# Patient Record
Sex: FEMALE | Race: WHITE | NOT HISPANIC OR LATINO | Employment: FULL TIME | URBAN - METROPOLITAN AREA
[De-identification: names, ages, dates, MRNs, and addresses within clinical notes are randomized per-mention and may not be internally consistent; named-entity substitution may affect disease eponyms.]

---

## 2023-03-09 DIAGNOSIS — F41.9 ANXIETY: Primary | ICD-10-CM

## 2023-03-09 RX ORDER — ALPRAZOLAM 0.25 MG/1
0.25 TABLET ORAL DAILY
COMMUNITY
End: 2023-03-09 | Stop reason: SDUPTHER

## 2023-03-09 RX ORDER — ALPRAZOLAM 0.25 MG/1
0.25 TABLET ORAL 3 TIMES DAILY PRN
Qty: 21 TABLET | Refills: 2 | Status: SHIPPED | OUTPATIENT
Start: 2023-03-09 | End: 2023-06-21 | Stop reason: SDUPTHER

## 2023-05-08 PROBLEM — E78.5 HYPERLIPIDEMIA: Status: ACTIVE | Noted: 2023-05-08

## 2023-05-08 PROBLEM — F41.9 ANXIETY: Status: ACTIVE | Noted: 2023-05-08

## 2023-05-08 PROBLEM — G47.00 INSOMNIA: Status: ACTIVE | Noted: 2023-05-08

## 2023-05-08 PROBLEM — G43.909 MIGRAINE HEADACHE: Status: ACTIVE | Noted: 2023-05-08

## 2023-05-08 PROBLEM — D50.9 IRON DEFICIENCY ANEMIA: Status: ACTIVE | Noted: 2023-05-08

## 2023-05-08 PROBLEM — F43.21 GRIEVING: Status: ACTIVE | Noted: 2023-05-08

## 2023-05-10 ENCOUNTER — OFFICE VISIT (OUTPATIENT)
Dept: PRIMARY CARE | Facility: CLINIC | Age: 58
End: 2023-05-10
Payer: COMMERCIAL

## 2023-05-10 VITALS
BODY MASS INDEX: 37.42 KG/M2 | WEIGHT: 218 LBS | HEART RATE: 120 BPM | TEMPERATURE: 97.8 F | SYSTOLIC BLOOD PRESSURE: 128 MMHG | OXYGEN SATURATION: 94 % | DIASTOLIC BLOOD PRESSURE: 70 MMHG | RESPIRATION RATE: 16 BRPM

## 2023-05-10 DIAGNOSIS — F41.9 ANXIETY: Primary | ICD-10-CM

## 2023-05-10 DIAGNOSIS — G43.809 OTHER MIGRAINE WITHOUT STATUS MIGRAINOSUS, NOT INTRACTABLE: ICD-10-CM

## 2023-05-10 DIAGNOSIS — F43.21 GRIEVING: ICD-10-CM

## 2023-05-10 PROCEDURE — 99214 OFFICE O/P EST MOD 30 MIN: CPT | Performed by: INTERNAL MEDICINE

## 2023-05-10 PROCEDURE — 1036F TOBACCO NON-USER: CPT | Performed by: INTERNAL MEDICINE

## 2023-05-10 RX ORDER — LYSINE HCL 500 MG
2 TABLET ORAL DAILY
COMMUNITY
Start: 2013-04-18

## 2023-05-10 RX ORDER — LOPERAMIDE HYDROCHLORIDE 2 MG/1
2 CAPSULE ORAL
COMMUNITY
Start: 2009-07-02

## 2023-05-10 RX ORDER — ACETAMINOPHEN, DIPHENHYDRAMINE HCL, PHENYLEPHRINE HCL 325; 25; 5 MG/1; MG/1; MG/1
TABLET ORAL
COMMUNITY

## 2023-05-10 RX ORDER — CHOLECALCIFEROL (VITAMIN D3) 50 MCG
1 TABLET ORAL
COMMUNITY
Start: 2013-04-18

## 2023-05-10 RX ORDER — BIOTIN 1 MG
1000 TABLET ORAL DAILY
COMMUNITY

## 2023-05-10 RX ORDER — IBUPROFEN 600 MG/1
TABLET ORAL
COMMUNITY
Start: 2014-08-08 | End: 2024-02-14 | Stop reason: SDUPTHER

## 2023-05-10 RX ORDER — BUSPIRONE HYDROCHLORIDE 7.5 MG/1
7.5 TABLET ORAL 2 TIMES DAILY
COMMUNITY
End: 2023-08-16 | Stop reason: SDUPTHER

## 2023-05-10 RX ORDER — CEFUROXIME AXETIL 250 MG/1
6 TABLET ORAL AS NEEDED
COMMUNITY
Start: 2019-01-10 | End: 2024-02-13 | Stop reason: SDUPTHER

## 2023-05-10 ASSESSMENT — ENCOUNTER SYMPTOMS
SHORTNESS OF BREATH: 0
CONSTIPATION: 0
DIARRHEA: 0

## 2023-05-10 NOTE — PROGRESS NOTES
Patient here for a follow up    Subjective   Patient ID: Julia Gandhi is a 58 y.o. female who presents for Follow-up.    The patient has been taking buspirone 7.5mg once daily in the evening and adding alprazolam 0.25mg as needed in the evenings.  She finds that this regimen is helping significantly and her sleep has improved.  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    The patient completed right-sided cataract surgery in 3/2023 with  and is recovering well.  She is following with Ophthalmology and reports that the left eye likely does not need surgery.     The patient is pleased to report a weight loss of 75 lbs since 4/2023 with dietary changes and regular exercise on her treadmill at home.  She is no longer purging after eating heavy meals.  She denies any dyspnea, chest pain, or chest pressure.    The patient denies any bowel problems.      Review of Systems   Respiratory:  Negative for shortness of breath.    Cardiovascular:  Negative for chest pain.   Gastrointestinal:  Negative for constipation and diarrhea.     Objective   Physical Exam  Constitutional:       Appearance: Normal appearance.   Cardiovascular:      Rate and Rhythm: Normal rate and regular rhythm.      Heart sounds: Normal heart sounds.   Pulmonary:      Effort: Pulmonary effort is normal.      Breath sounds: Normal breath sounds.   Abdominal:      General: Bowel sounds are normal.      Palpations: Abdomen is soft.      Tenderness: There is no abdominal tenderness.   Skin:     General: Skin is warm and dry.   Neurological:      General: No focal deficit present.      Mental Status: She is alert and oriented to person, place, and time. Mental status is at baseline.   Psychiatric:         Mood and Affect: Mood normal.         Behavior: Behavior normal.       Assessment/Plan   Problem List Items Addressed This Visit        Anxiety - Primary    Grieving    Migraine headache       IMPRESSIONS/PLAN:     /70 in the office today.     HLD   - Cholesterol, LDL borderline high per 10/2022 lipid panel, patient advised to continue to lower levels through healthy diet and regular physical activity.  She has been doing excellent with exercise.    Grieving   - Significantly improved.  Continue with buspirone 7.5mg every day (she is only taking once daily and this is working well). Discussed side effect profile and therapeutic expectations in detail. Patient verbalized understanding of tapering off rather than stopping abruptly if she decides to discontinue.  Previously on escitalopram 5 mg at bedtime.  Patient has referral to Adult Psychology with Jacque DORSEY. We discussed there is no reason to feel guilty with what happened- she has taken such great care of her mom over the years. She has a low dose of Alprazolam on hand if needed. She has good support of her close friend and her family. I told her she can reach out to me anytime as well if needed.      Anxiety   - Continue with alprazolam 0.25mg as needed for worsening anxiety. Advised not to drive while on this medication or mix with alcohol. OARRS UTD. Substance controlled contract UTD. Drug screen UTD.      Insomnia   - Continue Melatonin Nature's Bounty to help with both falling asleep and staying asleep.     Migraine   - Takes sumatriptan 6mg/0.5mL auto-injector, 0.5mL as needed at onset of migraine.     Vitamin D Deficiency   - Takes Vitamin D 50mcg QD.     Health Maintenance   - Routine labs up to date 10/2022, will repeat prior to next visit. Last Mammogram 1/2023. Last colonoscopy 10/2022, repeat due 10/2027.      Follow up in 3 months, call sooner if needed.        Scribe Attestation  By signing my name below, I, Neisha Eugene   attest that this documentation has been prepared under the direction and in the presence of Emile Israel DO.

## 2023-06-21 DIAGNOSIS — F41.9 ANXIETY: ICD-10-CM

## 2023-06-21 RX ORDER — ALPRAZOLAM 0.25 MG/1
0.25 TABLET ORAL NIGHTLY PRN
Qty: 30 TABLET | Refills: 0 | Status: SHIPPED | OUTPATIENT
Start: 2023-06-21 | End: 2023-08-16 | Stop reason: SDUPTHER

## 2023-07-19 DIAGNOSIS — Z00.00 HEALTHCARE MAINTENANCE: Primary | ICD-10-CM

## 2023-08-07 ENCOUNTER — LAB (OUTPATIENT)
Dept: LAB | Facility: LAB | Age: 58
End: 2023-08-07
Payer: COMMERCIAL

## 2023-08-07 DIAGNOSIS — Z00.00 HEALTHCARE MAINTENANCE: ICD-10-CM

## 2023-08-07 LAB
ALANINE AMINOTRANSFERASE (SGPT) (U/L) IN SER/PLAS: 13 U/L (ref 7–45)
ALBUMIN (G/DL) IN SER/PLAS: 4.4 G/DL (ref 3.4–5)
ALKALINE PHOSPHATASE (U/L) IN SER/PLAS: 58 U/L (ref 33–110)
ANION GAP IN SER/PLAS: 12 MMOL/L (ref 10–20)
ASPARTATE AMINOTRANSFERASE (SGOT) (U/L) IN SER/PLAS: 17 U/L (ref 9–39)
BASOPHILS (10*3/UL) IN BLOOD BY AUTOMATED COUNT: 0.04 X10E9/L (ref 0–0.1)
BASOPHILS/100 LEUKOCYTES IN BLOOD BY AUTOMATED COUNT: 0.6 % (ref 0–2)
BILIRUBIN TOTAL (MG/DL) IN SER/PLAS: 0.7 MG/DL (ref 0–1.2)
CALCIUM (MG/DL) IN SER/PLAS: 9.7 MG/DL (ref 8.6–10.3)
CARBON DIOXIDE, TOTAL (MMOL/L) IN SER/PLAS: 28 MMOL/L (ref 21–32)
CHLORIDE (MMOL/L) IN SER/PLAS: 106 MMOL/L (ref 98–107)
CHOLESTEROL (MG/DL) IN SER/PLAS: 234 MG/DL (ref 0–199)
CHOLESTEROL IN HDL (MG/DL) IN SER/PLAS: 76.9 MG/DL
CHOLESTEROL/HDL RATIO: 3
CREATININE (MG/DL) IN SER/PLAS: 0.91 MG/DL (ref 0.5–1.05)
EOSINOPHILS (10*3/UL) IN BLOOD BY AUTOMATED COUNT: 0.16 X10E9/L (ref 0–0.7)
EOSINOPHILS/100 LEUKOCYTES IN BLOOD BY AUTOMATED COUNT: 2.2 % (ref 0–6)
ERYTHROCYTE DISTRIBUTION WIDTH (RATIO) BY AUTOMATED COUNT: 13.6 % (ref 11.5–14.5)
ERYTHROCYTE MEAN CORPUSCULAR HEMOGLOBIN CONCENTRATION (G/DL) BY AUTOMATED: 32.5 G/DL (ref 32–36)
ERYTHROCYTE MEAN CORPUSCULAR VOLUME (FL) BY AUTOMATED COUNT: 91 FL (ref 80–100)
ERYTHROCYTES (10*6/UL) IN BLOOD BY AUTOMATED COUNT: 4.59 X10E12/L (ref 4–5.2)
ESTIMATED AVERAGE GLUCOSE FOR HBA1C: 111 MG/DL
GFR FEMALE: 73 ML/MIN/1.73M2
GLUCOSE (MG/DL) IN SER/PLAS: 81 MG/DL (ref 74–99)
HEMATOCRIT (%) IN BLOOD BY AUTOMATED COUNT: 41.9 % (ref 36–46)
HEMOGLOBIN (G/DL) IN BLOOD: 13.6 G/DL (ref 12–16)
HEMOGLOBIN A1C/HEMOGLOBIN TOTAL IN BLOOD: 5.5 %
IMMATURE GRANULOCYTES/100 LEUKOCYTES IN BLOOD BY AUTOMATED COUNT: 0.4 % (ref 0–0.9)
LDL: 135 MG/DL (ref 0–99)
LEUKOCYTES (10*3/UL) IN BLOOD BY AUTOMATED COUNT: 7.2 X10E9/L (ref 4.4–11.3)
LYMPHOCYTES (10*3/UL) IN BLOOD BY AUTOMATED COUNT: 2.41 X10E9/L (ref 1.2–4.8)
LYMPHOCYTES/100 LEUKOCYTES IN BLOOD BY AUTOMATED COUNT: 33.4 % (ref 13–44)
MONOCYTES (10*3/UL) IN BLOOD BY AUTOMATED COUNT: 0.4 X10E9/L (ref 0.1–1)
MONOCYTES/100 LEUKOCYTES IN BLOOD BY AUTOMATED COUNT: 5.5 % (ref 2–10)
NEUTROPHILS (10*3/UL) IN BLOOD BY AUTOMATED COUNT: 4.18 X10E9/L (ref 1.2–7.7)
NEUTROPHILS/100 LEUKOCYTES IN BLOOD BY AUTOMATED COUNT: 57.9 % (ref 40–80)
PLATELETS (10*3/UL) IN BLOOD AUTOMATED COUNT: 326 X10E9/L (ref 150–450)
POTASSIUM (MMOL/L) IN SER/PLAS: 4 MMOL/L (ref 3.5–5.3)
PROTEIN TOTAL: 7.1 G/DL (ref 6.4–8.2)
SODIUM (MMOL/L) IN SER/PLAS: 142 MMOL/L (ref 136–145)
THYROTROPIN (MIU/L) IN SER/PLAS BY DETECTION LIMIT <= 0.05 MIU/L: 2.37 MIU/L (ref 0.44–3.98)
TRIGLYCERIDE (MG/DL) IN SER/PLAS: 112 MG/DL (ref 0–149)
UREA NITROGEN (MG/DL) IN SER/PLAS: 13 MG/DL (ref 6–23)
VLDL: 22 MG/DL (ref 0–40)

## 2023-08-07 PROCEDURE — 80053 COMPREHEN METABOLIC PANEL: CPT

## 2023-08-07 PROCEDURE — 36415 COLL VENOUS BLD VENIPUNCTURE: CPT

## 2023-08-07 PROCEDURE — 85025 COMPLETE CBC W/AUTO DIFF WBC: CPT

## 2023-08-07 PROCEDURE — 83036 HEMOGLOBIN GLYCOSYLATED A1C: CPT

## 2023-08-07 PROCEDURE — 80061 LIPID PANEL: CPT

## 2023-08-07 PROCEDURE — 84443 ASSAY THYROID STIM HORMONE: CPT

## 2023-08-16 ENCOUNTER — OFFICE VISIT (OUTPATIENT)
Dept: PRIMARY CARE | Facility: CLINIC | Age: 58
End: 2023-08-16
Payer: COMMERCIAL

## 2023-08-16 VITALS
TEMPERATURE: 97.7 F | OXYGEN SATURATION: 92 % | BODY MASS INDEX: 36.9 KG/M2 | DIASTOLIC BLOOD PRESSURE: 80 MMHG | WEIGHT: 215 LBS | RESPIRATION RATE: 16 BRPM | SYSTOLIC BLOOD PRESSURE: 128 MMHG | HEART RATE: 89 BPM

## 2023-08-16 DIAGNOSIS — F41.9 ANXIETY: ICD-10-CM

## 2023-08-16 PROBLEM — H52.4 PRESBYOPIA: Status: ACTIVE | Noted: 2023-07-27

## 2023-08-16 PROBLEM — H25.012 CORTICAL AGE-RELATED CATARACT OF LEFT EYE: Status: ACTIVE | Noted: 2023-02-18

## 2023-08-16 PROBLEM — H25.13 AGE-RELATED NUCLEAR CATARACT OF BOTH EYES: Status: ACTIVE | Noted: 2023-02-18

## 2023-08-16 PROBLEM — H33.192: Status: ACTIVE | Noted: 2023-02-18

## 2023-08-16 PROBLEM — H52.13 MYOPIA, BILATERAL: Status: ACTIVE | Noted: 2023-07-27

## 2023-08-16 PROBLEM — H43.822 VITREOMACULAR ADHESION OF LEFT EYE: Status: ACTIVE | Noted: 2023-02-18

## 2023-08-16 PROBLEM — H43.813 VITREOUS DEGENERATION OF BOTH EYES: Status: ACTIVE | Noted: 2023-02-18

## 2023-08-16 PROBLEM — H35.372 EPIRETINAL MEMBRANE, LEFT EYE: Status: ACTIVE | Noted: 2023-02-18

## 2023-08-16 PROBLEM — H43.812 POSTERIOR VITREOUS DETACHMENT OF LEFT EYE: Status: ACTIVE | Noted: 2023-07-27

## 2023-08-16 PROBLEM — Z96.1 PSEUDOPHAKIA OF RIGHT EYE: Status: ACTIVE | Noted: 2023-04-25

## 2023-08-16 PROBLEM — H04.123 DRY EYE SYNDROME OF BOTH EYES: Status: ACTIVE | Noted: 2023-02-18

## 2023-08-16 PROBLEM — Z98.890 HX OF VITRECTOMY: Status: ACTIVE | Noted: 2023-07-27

## 2023-08-16 PROBLEM — H25.12 NUCLEAR SCLEROTIC CATARACT OF LEFT EYE: Status: ACTIVE | Noted: 2023-04-25

## 2023-08-16 PROBLEM — M25.9 DISORDER OF SHOULDER: Status: ACTIVE | Noted: 2018-01-18

## 2023-08-16 PROCEDURE — 99214 OFFICE O/P EST MOD 30 MIN: CPT | Performed by: INTERNAL MEDICINE

## 2023-08-16 PROCEDURE — 1036F TOBACCO NON-USER: CPT | Performed by: INTERNAL MEDICINE

## 2023-08-16 RX ORDER — BUSPIRONE HYDROCHLORIDE 7.5 MG/1
7.5 TABLET ORAL 2 TIMES DAILY
Qty: 90 TABLET | Refills: 3 | Status: SHIPPED | OUTPATIENT
Start: 2023-08-16 | End: 2024-02-19 | Stop reason: SDUPTHER

## 2023-08-16 RX ORDER — ALPRAZOLAM 0.25 MG/1
0.25 TABLET ORAL NIGHTLY PRN
Qty: 30 TABLET | Refills: 0 | Status: SHIPPED | OUTPATIENT
Start: 2023-08-16 | End: 2023-10-10 | Stop reason: SDUPTHER

## 2023-08-16 NOTE — PROGRESS NOTES
Patient here for a 3 month follow up    Subjective   Patient ID: Julia Gandhi is a 58 y.o. female who presents for Follow-up.  She is generally doing well today.    The patient has been taking buspirone 7.5 mg once nightly, and finds this is helping to control symptoms well.  She has noticed mild lightheadedness after she takes the medication, and has been avoiding taking it during the day time.  She states that she has not taken escitalopram in the past due to concerns about adverse effects.     The patient mentions occasional migraines which she notes seem to be worse with changes in humidity.    The patient continues to follow with  from Ophthalmology for pseudophakia of the right eye, and bilateral age-related nuclear cataracts.    The patient denies any hearing impairment.    The patient is pleased to report a weight loss of 71 lbs since 8/2022 which she attributes to regular exercise on her treadmill, and a healthy diet.        Review of Systems   HENT:  Negative for hearing loss.    All other systems reviewed and are negative.    Objective   Physical Exam  Constitutional:       Appearance: Normal appearance.   Neck:      Vascular: No carotid bruit.   Cardiovascular:      Rate and Rhythm: Normal rate and regular rhythm.      Heart sounds: Normal heart sounds.   Pulmonary:      Effort: Pulmonary effort is normal.      Breath sounds: Normal breath sounds.   Abdominal:      General: Bowel sounds are normal.      Palpations: Abdomen is soft.      Tenderness: There is no abdominal tenderness.   Skin:     General: Skin is warm and dry.   Neurological:      General: No focal deficit present.      Mental Status: She is alert and oriented to person, place, and time. Mental status is at baseline.   Psychiatric:         Mood and Affect: Mood normal.         Behavior: Behavior normal.       Assessment/Plan       IMPRESSIONS/PLAN:     /80 in the office today.     HLD   - Cholesterol, LDL borderline high  per 8/2023 lipid panel, patient advised to continue to lower levels through healthy diet and regular physical activity.  She has been doing excellent with exercise.  ASCVD 2.1% per 8/2023.       Grieving   - Significantly improved.  Refilled buspirone 7.5mg every day (she is only taking once nightly and this is working well).  She has a low dose of Alprazolam on hand if needed.     Anxiety   - Stable.  Refilled alprazolam 0.25mg as needed for worsening anxiety. Advised not to drive while on this medication or mix with alcohol. OARRS UTD. Substance controlled contract UTD. Drug screen UTD.      Insomnia   - Continue Melatonin Nature's Bounty to help with both falling asleep and staying asleep.     Migraine   - Takes sumatriptan 6mg/0.5mL auto-injector, 0.5mL as needed at onset of migraine.     Vitamin D Deficiency   - Takes Vitamin D 50mcg QD.     Health Maintenance   - Routine labs 8/2023. Last Mammogram  1/2023. Last colonoscopy 10/2022, repeat due 10/2027.      Follow up in 3 months, call sooner if needed.        Scribe Attestation  By signing my name below, I, Neisha Eugene   attest that this documentation has been prepared under the direction and in the presence of Emile Israel DO.

## 2023-10-10 DIAGNOSIS — F41.9 ANXIETY: ICD-10-CM

## 2023-10-10 RX ORDER — ALPRAZOLAM 0.25 MG/1
0.25 TABLET ORAL NIGHTLY PRN
Qty: 30 TABLET | Refills: 0 | Status: SHIPPED | OUTPATIENT
Start: 2023-10-10 | End: 2023-10-20 | Stop reason: SDUPTHER

## 2023-10-20 DIAGNOSIS — F41.9 ANXIETY: ICD-10-CM

## 2023-10-20 RX ORDER — ALPRAZOLAM 0.25 MG/1
0.25 TABLET ORAL NIGHTLY PRN
Qty: 30 TABLET | Refills: 0 | Status: SHIPPED | OUTPATIENT
Start: 2023-10-20 | End: 2023-11-13 | Stop reason: SDUPTHER

## 2023-11-13 ENCOUNTER — OFFICE VISIT (OUTPATIENT)
Dept: PRIMARY CARE | Facility: CLINIC | Age: 58
End: 2023-11-13
Payer: COMMERCIAL

## 2023-11-13 VITALS
HEIGHT: 64 IN | OXYGEN SATURATION: 96 % | TEMPERATURE: 98 F | HEART RATE: 54 BPM | BODY MASS INDEX: 36.37 KG/M2 | WEIGHT: 213 LBS | DIASTOLIC BLOOD PRESSURE: 80 MMHG | RESPIRATION RATE: 16 BRPM | SYSTOLIC BLOOD PRESSURE: 128 MMHG

## 2023-11-13 DIAGNOSIS — F41.9 ANXIETY: ICD-10-CM

## 2023-11-13 DIAGNOSIS — Z23 ENCOUNTER FOR IMMUNIZATION: ICD-10-CM

## 2023-11-13 DIAGNOSIS — E78.5 HYPERLIPIDEMIA, UNSPECIFIED HYPERLIPIDEMIA TYPE: Primary | ICD-10-CM

## 2023-11-13 PROCEDURE — 90471 IMMUNIZATION ADMIN: CPT | Performed by: INTERNAL MEDICINE

## 2023-11-13 PROCEDURE — 99396 PREV VISIT EST AGE 40-64: CPT | Performed by: INTERNAL MEDICINE

## 2023-11-13 PROCEDURE — 1036F TOBACCO NON-USER: CPT | Performed by: INTERNAL MEDICINE

## 2023-11-13 PROCEDURE — 93000 ELECTROCARDIOGRAM COMPLETE: CPT | Performed by: INTERNAL MEDICINE

## 2023-11-13 PROCEDURE — 90686 IIV4 VACC NO PRSV 0.5 ML IM: CPT | Performed by: INTERNAL MEDICINE

## 2023-11-13 RX ORDER — ALPRAZOLAM 0.25 MG/1
0.25 TABLET ORAL NIGHTLY PRN
Qty: 30 TABLET | Refills: 0 | Status: SHIPPED | OUTPATIENT
Start: 2023-11-13 | End: 2023-12-12 | Stop reason: SDUPTHER

## 2023-11-13 ASSESSMENT — ENCOUNTER SYMPTOMS
DIARRHEA: 0
CONSTIPATION: 0
FREQUENCY: 0

## 2023-11-13 NOTE — PROGRESS NOTES
Patient here for a physical     Subjective   Patient ID: Julia Gandhi is a 58 y.o. female who presents for Annual Exam.    The patient recently underwent right-sided YAG laser capsulotomy after cataract surgery several months prior in the same eye.  She continues to follow with  from Ophthalmology.    The patient continues to take buspirone 7.5 mg once nightly as well as alprazolam 0.25mg rarely as needed, and finds this regimen is working well.  She is also taking Melatonin in the evening, and finds this is helping with sleep.    The patient is following with  from Obstetrics[facility]ne[facility].     The patient is careful to maintain a healthy diet via intermittent fasting and regular physical activity.  She is taking cranberry supplements and probiotics.  As a result, she denies any urinary symptoms or bowel problems.      Review of Systems   Gastrointestinal:  Negative for constipation and diarrhea.   Genitourinary:  Negative for frequency.     Objective   Physical Exam  Constitutional:       Appearance: Normal appearance.   Neck:      Vascular: No carotid bruit.   Cardiovascular:      Rate and Rhythm: Normal rate and regular rhythm.      Heart sounds: Normal heart sounds.   Pulmonary:      Effort: Pulmonary effort is normal.      Breath sounds: Normal breath sounds.   Abdominal:      General: Bowel sounds are normal.      Palpations: Abdomen is soft.      Tenderness: There is no abdominal tenderness.   Skin:     General: Skin is warm and dry.   Neurological:      General: No focal deficit present.      Mental Status: She is alert and oriented to person, place, and time. Mental status is at baseline.   Psychiatric:         Mood and Affect: Mood normal.         Behavior: Behavior normal.       Assessment/Plan   Problem List Items Addressed This Visit             ICD-10-CM    Anxiety F41.9    Relevant Medications    ALPRAZolam (Xanax) 0.25 mg tablet    Hyperlipidemia - Primary  E78.5    Relevant Orders    ECG 12 lead (Clinic Performed) (Completed)     Other Visit Diagnoses         Codes    Encounter for immunization     Z23    Relevant Orders    Flu vaccine (IIV4) age 6 months and greater, preservative free (Completed)            CPE Performed  -  Discussed healthy diet and regular exercise.    -  Physical exam overall unremarkable. Immunizations reviewed and updated accordingly. Healthy lifestyle choices discussed (tobacco avoidance, appropriate alcohol use, avoidance of illicit substances).   -  Patient is wearing seatbelt.   -  Screening lab work ordered as indicated.    -  Age appropriate screening tests reviewed with patient.        EKG unremarkable compared to previous.    IMPRESSIONS/PLAN:     /80 in the office today.     HLD   - Cholesterol, LDL borderline high per 8/2023 lipid panel, patient advised to continue to lower levels through healthy diet and regular physical activity.  She has been doing excellent with exercise.  ASCVD 2.1% per 8/2023.       Grieving   - Significantly improved.  Refilled buspirone 7.5mg every day (she is only taking once nightly and this is working well).  She has a low dose of Alprazolam on hand if needed.     Anxiety   - Stable.  Refilled alprazolam 0.25mg as needed for worsening anxiety. Advised not to drive while on this medication or mix with alcohol. OARRS UTD. Substance controlled contract UTD. Drug screen UTD.      Insomnia   - Continue Melatonin Nature's Bounty to help with both falling asleep and staying asleep.     Migraine   - Takes sumatriptan 6mg/0.5mL auto-injector, 0.5mL as needed at onset of migraine.     Vitamin D Deficiency   - Takes Vitamin D 50mcg QD.     Women's Wellness  - Following with  from Obstetrics/Gynecology.    Health Maintenance   - Routine labs 8/2023. Last Mammogram  1/2023. Last colonoscopy 10/2022, repeat due 10/2027. Patient received Influenza vaccine in the clinic today, tolerated well.      Follow  up in 3 months, call sooner if needed.        Scribe Attestation  By signing my name below, I, Heidi Guy, Neisha   attest that this documentation has been prepared under the direction and in the presence of Emile Israel DO.

## 2023-12-12 DIAGNOSIS — F41.9 ANXIETY: ICD-10-CM

## 2023-12-12 RX ORDER — ALPRAZOLAM 0.25 MG/1
0.25 TABLET ORAL NIGHTLY PRN
Qty: 30 TABLET | Refills: 0 | Status: SHIPPED | OUTPATIENT
Start: 2023-12-12 | End: 2024-01-18 | Stop reason: SDUPTHER

## 2024-01-18 DIAGNOSIS — F41.9 ANXIETY: ICD-10-CM

## 2024-01-18 RX ORDER — ALPRAZOLAM 0.25 MG/1
0.25 TABLET ORAL NIGHTLY PRN
Qty: 30 TABLET | Refills: 0 | Status: SHIPPED | OUTPATIENT
Start: 2024-01-18 | End: 2024-02-14 | Stop reason: SDUPTHER

## 2024-02-13 ENCOUNTER — APPOINTMENT (OUTPATIENT)
Dept: RADIOLOGY | Facility: HOSPITAL | Age: 59
End: 2024-02-13
Payer: COMMERCIAL

## 2024-02-13 ENCOUNTER — HOSPITAL ENCOUNTER (EMERGENCY)
Facility: HOSPITAL | Age: 59
Discharge: HOME | End: 2024-02-13
Attending: EMERGENCY MEDICINE
Payer: COMMERCIAL

## 2024-02-13 ENCOUNTER — PATIENT MESSAGE (OUTPATIENT)
Dept: PRIMARY CARE | Facility: CLINIC | Age: 59
End: 2024-02-13
Payer: COMMERCIAL

## 2024-02-13 VITALS
HEART RATE: 90 BPM | OXYGEN SATURATION: 93 % | WEIGHT: 160 LBS | SYSTOLIC BLOOD PRESSURE: 131 MMHG | BODY MASS INDEX: 27.31 KG/M2 | DIASTOLIC BLOOD PRESSURE: 71 MMHG | HEIGHT: 64 IN | RESPIRATION RATE: 18 BRPM | TEMPERATURE: 98.1 F

## 2024-02-13 DIAGNOSIS — G43.809 OTHER MIGRAINE WITHOUT STATUS MIGRAINOSUS, NOT INTRACTABLE: ICD-10-CM

## 2024-02-13 DIAGNOSIS — T14.8XXA ABRASION: ICD-10-CM

## 2024-02-13 DIAGNOSIS — G43.809 OTHER MIGRAINE WITHOUT STATUS MIGRAINOSUS, NOT INTRACTABLE: Primary | ICD-10-CM

## 2024-02-13 DIAGNOSIS — S09.90XA CLOSED HEAD INJURY, INITIAL ENCOUNTER: Primary | ICD-10-CM

## 2024-02-13 PROCEDURE — 99285 EMERGENCY DEPT VISIT HI MDM: CPT | Mod: 25 | Performed by: EMERGENCY MEDICINE

## 2024-02-13 PROCEDURE — 70486 CT MAXILLOFACIAL W/O DYE: CPT

## 2024-02-13 PROCEDURE — 70450 CT HEAD/BRAIN W/O DYE: CPT | Performed by: RADIOLOGY

## 2024-02-13 PROCEDURE — 90715 TDAP VACCINE 7 YRS/> IM: CPT | Performed by: STUDENT IN AN ORGANIZED HEALTH CARE EDUCATION/TRAINING PROGRAM

## 2024-02-13 PROCEDURE — 70486 CT MAXILLOFACIAL W/O DYE: CPT | Performed by: RADIOLOGY

## 2024-02-13 PROCEDURE — 70450 CT HEAD/BRAIN W/O DYE: CPT

## 2024-02-13 PROCEDURE — 90471 IMMUNIZATION ADMIN: CPT | Performed by: STUDENT IN AN ORGANIZED HEALTH CARE EDUCATION/TRAINING PROGRAM

## 2024-02-13 PROCEDURE — 99284 EMERGENCY DEPT VISIT MOD MDM: CPT | Performed by: EMERGENCY MEDICINE

## 2024-02-13 PROCEDURE — 76377 3D RENDER W/INTRP POSTPROCES: CPT | Performed by: RADIOLOGY

## 2024-02-13 PROCEDURE — 76377 3D RENDER W/INTRP POSTPROCES: CPT

## 2024-02-13 PROCEDURE — 2500000004 HC RX 250 GENERAL PHARMACY W/ HCPCS (ALT 636 FOR OP/ED): Performed by: STUDENT IN AN ORGANIZED HEALTH CARE EDUCATION/TRAINING PROGRAM

## 2024-02-13 RX ORDER — CEFUROXIME AXETIL 250 MG/1
6 TABLET ORAL AS NEEDED
Qty: 1 ML | Refills: 2 | Status: SHIPPED | OUTPATIENT
Start: 2024-02-13 | End: 2024-05-20 | Stop reason: SDUPTHER

## 2024-02-13 RX ORDER — CEFUROXIME AXETIL 250 MG/1
6 TABLET ORAL AS NEEDED
Qty: 1 ML | Refills: 2 | Status: SHIPPED | OUTPATIENT
Start: 2024-02-13 | End: 2024-02-13

## 2024-02-13 RX ADMIN — TETANUS TOXOID, REDUCED DIPHTHERIA TOXOID AND ACELLULAR PERTUSSIS VACCINE, ADSORBED 0.5 ML: 5; 2.5; 8; 8; 2.5 SUSPENSION INTRAMUSCULAR at 20:02

## 2024-02-13 ASSESSMENT — COLUMBIA-SUICIDE SEVERITY RATING SCALE - C-SSRS
1. IN THE PAST MONTH, HAVE YOU WISHED YOU WERE DEAD OR WISHED YOU COULD GO TO SLEEP AND NOT WAKE UP?: NO
2. HAVE YOU ACTUALLY HAD ANY THOUGHTS OF KILLING YOURSELF?: NO
6. HAVE YOU EVER DONE ANYTHING, STARTED TO DO ANYTHING, OR PREPARED TO DO ANYTHING TO END YOUR LIFE?: NO

## 2024-02-13 ASSESSMENT — PAIN DESCRIPTION - ORIENTATION: ORIENTATION: RIGHT

## 2024-02-13 ASSESSMENT — PAIN DESCRIPTION - LOCATION: LOCATION: HEAD

## 2024-02-13 ASSESSMENT — PAIN DESCRIPTION - DESCRIPTORS: DESCRIPTORS: ACHING

## 2024-02-13 ASSESSMENT — PAIN SCALES - GENERAL: PAINLEVEL_OUTOF10: 5 - MODERATE PAIN

## 2024-02-13 ASSESSMENT — LIFESTYLE VARIABLES
HAVE YOU EVER FELT YOU SHOULD CUT DOWN ON YOUR DRINKING: NO
EVER HAD A DRINK FIRST THING IN THE MORNING TO STEADY YOUR NERVES TO GET RID OF A HANGOVER: NO
HAVE PEOPLE ANNOYED YOU BY CRITICIZING YOUR DRINKING: NO
EVER FELT BAD OR GUILTY ABOUT YOUR DRINKING: NO

## 2024-02-13 ASSESSMENT — PAIN - FUNCTIONAL ASSESSMENT: PAIN_FUNCTIONAL_ASSESSMENT: 0-10

## 2024-02-13 ASSESSMENT — PAIN DESCRIPTION - ONSET: ONSET: ONGOING

## 2024-02-13 ASSESSMENT — PAIN DESCRIPTION - FREQUENCY: FREQUENCY: CONSTANT/CONTINUOUS

## 2024-02-13 ASSESSMENT — PAIN DESCRIPTION - PAIN TYPE: TYPE: ACUTE PAIN

## 2024-02-13 ASSESSMENT — PAIN DESCRIPTION - PROGRESSION: CLINICAL_PROGRESSION: NOT CHANGED

## 2024-02-14 ENCOUNTER — OFFICE VISIT (OUTPATIENT)
Dept: PRIMARY CARE | Facility: CLINIC | Age: 59
End: 2024-02-14
Payer: COMMERCIAL

## 2024-02-14 VITALS
HEART RATE: 86 BPM | OXYGEN SATURATION: 98 % | RESPIRATION RATE: 16 BRPM | SYSTOLIC BLOOD PRESSURE: 130 MMHG | TEMPERATURE: 97.3 F | DIASTOLIC BLOOD PRESSURE: 80 MMHG

## 2024-02-14 DIAGNOSIS — F41.9 ANXIETY: ICD-10-CM
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
## 2024-02-14 PROCEDURE — 99214 OFFICE O/P EST MOD 30 MIN: CPT | Performed by: INTERNAL MEDICINE

## 2024-02-14 PROCEDURE — 1036F TOBACCO NON-USER: CPT | Performed by: INTERNAL MEDICINE

## 2024-02-14 RX ORDER — ALPRAZOLAM 0.25 MG/1
0.25 TABLET ORAL NIGHTLY PRN
Qty: 30 TABLET | Refills: 0 | Status: SHIPPED | OUTPATIENT
Start: 2024-02-14 | End: 2024-03-18 | Stop reason: SDUPTHER

## 2024-02-14 RX ORDER — IBUPROFEN 600 MG/1
TABLET ORAL
Qty: 60 TABLET | Refills: 0 | Status: SHIPPED | OUTPATIENT
Start: 2024-02-14

## 2024-02-14 ASSESSMENT — ENCOUNTER SYMPTOMS
SLEEP DISTURBANCE: 1
DYSPHORIC MOOD: 1

## 2024-02-14 NOTE — ED PROVIDER NOTES
HPI   Chief Complaint   Patient presents with    Fall     Patient states she was bring trash cans back to the house slipped and fell in the mud. Patient states struck head and face on garbage can. No LOC. No blood thinners. Patient states pain head has become worse. Pt denies nausea, vomiting, changes in vison or speech.        Patient is a 59-year-old female presents the ER due to concern for closed head injury.  Patient states that she was pushing garbage can when she actually stepped forward and hit her head.  She not lose consciousness however has a large hematoma on her scalp.  She also did hit her face.  She does have some right-sided maxillary pain.  She denies any dental fractures.  She denies any loss conscious.  She is not on blood thinners.  She denies any neck pain.  She denies any chest pain, shortness of breath, abdominal pain.      History provided by:  Patient   used: No                        McArthur Coma Scale Score: 15                     Patient History   Past Medical History:   Diagnosis Date    Morbid (severe) obesity due to excess calories (CMS/[facility]) 12/27/2022    Morbid obesity with BMI of 40.0-44.9, adult     Past Surgical History:   Procedure Laterality Date    COLONOSCOPY  11/05/2012    Complete Colonoscopy    LAPAROSCOPY DIAGNOSTIC / BIOPSY / ASPIRATION / LYSIS  06/17/2013    Laparoscopy (Diagnostic)    SHOULDER SURGERY  12/03/2012    Shoulder Surgery     Family History   Problem Relation Name Age of Onset    Coronary artery disease Mother      Colon cancer Father      Coronary artery disease Father       Social History     Tobacco Use    Smoking status: Never    Smokeless tobacco: Never   Vaping Use    Vaping Use: Never used   Substance Use Topics    Alcohol use: Never    Drug use: Never       Physical Exam   ED Triage Vitals [02/13/24 1811]   Temperature Heart Rate Respirations BP   36.7 °C (98.1 °F) 86 18 (!) 148/92      Pulse Ox Temp Source Heart Rate Source Patient  Position   95 % Temporal Monitor Sitting      BP Location FiO2 (%)     Right arm --       Physical Exam  Vitals and nursing note reviewed.   Constitutional:       General: She is not in acute distress.     Appearance: She is well-developed.   HENT:      Head: Normocephalic.      Comments: Large posterior scalp hematoma.     Ears:      Comments: No hemotympanum b/l     Nose:      Comments: No nasal septal hematoma  Eyes:      Conjunctiva/sclera: Conjunctivae normal.   Neck:      Comments: No midline tenderness.  Cardiovascular:      Rate and Rhythm: Normal rate and regular rhythm.      Heart sounds: No murmur heard.  Pulmonary:      Effort: Pulmonary effort is normal. No respiratory distress.      Breath sounds: Normal breath sounds.   Abdominal:      Palpations: Abdomen is soft.      Tenderness: There is no abdominal tenderness.   Musculoskeletal:         General: No swelling.      Cervical back: Neck supple.   Skin:     General: Skin is warm and dry.      Capillary Refill: Capillary refill takes less than 2 seconds.   Neurological:      Mental Status: She is alert.   Psychiatric:         Mood and Affect: Mood normal.         ED Course & MDM   ED Course as of 02/13/24 2041   Tue Feb 13, 2024 2040 Patient is a 59-year-old female presents to the ER due to concern for closed head injury.  On arrival she was in no acute distress.  Vitals are stable.  She we did order CT imaging of head and face for patient.  Based on [place] C-spine imaging and [facility], do not feel patient requires C-spine imaging at this time. [MJ]   2041 CT maxillofacial bones wo IV contrast [MJ]   2041 CT head wo IV contrast  IMPRESSION:  No acute intracranial abnormality.      Moderate soft tissue swelling and hematoma overlying the cranial  vertex.      No acute facial bone fracture.   [MJ]   2041 CT imaging of patient was unremarkable.  Difficulty will discharge patient home.  She was instructed follow-up with her primary care provider.  She did  receive [employer] updated for Tdap update today.  She was given strict return precautions.  Patient was understanding and agreeable with plan for discharge. [MJ]      ED Course User Index  [MJ] Juan RuthchagoDO marcia         Diagnoses as of 02/13/24 2041   Closed head injury, initial encounter   Abrasion       Medical Decision Making  =================Attending note===============    The patient was seen by the resident/fellow.  I have personally performed a substantive portion of the encounter.  I have seen and examined the patient; agree with the workup, evaluation, MDM,   management and diagnosis.  The care plan has been discussed with the resident; I have reviewed the resident's note and agree with the documented findings.      This is a 59 y.o. female who presents to ER with chief complaint of closed head injury after mechanical fall.  Patient was taking out the garbage to the curb and there was some mud and she slipped and she hit the right side of her face on the garbage can and then she hit the top of her head.  She is unsure if she hit her head on the ground or some in the fell out of the garbage can.  There is no loss of conscious.  No blood thinners.  No nausea or vomiting.  No midline neck pain.  There is a minor abrasion to the right side of her face.  She is really chronic medical problems migraines.  PERRLA.  EOMI.  Minor abrasion to the right cheek just lateral to the nose.  No CSF rhinorrhea.  No midline cervical spine tenderness.  There is some paraspinal neck tenderness.  No numbness or tingling or increasing pain with range of motion of the neck in all directions.  Heart regular.  Lungs clear.  Abdomen soft and nontender.  Moving extremities x 4 without difficulty.  No pain in the arms or the legs or the hips.  No back pain.    CT imaging had no acute findings.  We CT scan her face and head.  Cervical spine was cleared by [place] [facility] cervical spine rules.    Tetanus is updated since it was greater  than 5 years from her last tetanus vaccination and she had a scrape to the face from a dirty garbage can.    She is given closed head injury discharge instructions.  She is to follow-up with her doctor.  She is to return to the nearest ER for any new or worsening symptoms.  She happy with this plan.            ==========================================          Procedure  Procedures     Juan Mills,   Resident  02/13/24 2041       Errol Pryor, DO  02/15/24 0027

## 2024-02-14 NOTE — DISCHARGE INSTRUCTIONS
Seek immediate medical attention if you develop: new or worsening headache, nausea, vomiting, confusion, weakness, loss of motion in your arms or legs, loss of control of your urine or stool, difficulty waking from sleep, or any new or worsening symptoms.    Have someone check on you and wake you from sleep every 2 hours for the next 24 hours to make sure that you are doing well.    Seek immediate medical attention if your wound develops: redness, swelling, warmth to touch, discharge or drainage, increasing pain, or any new or worsening symptoms.

## 2024-02-16 ENCOUNTER — APPOINTMENT (OUTPATIENT)
Dept: PRIMARY CARE | Facility: CLINIC | Age: 59
End: 2024-02-16
Payer: COMMERCIAL

## 2024-02-19 DIAGNOSIS — F41.9 ANXIETY: ICD-10-CM

## 2024-02-19 RX ORDER — BUSPIRONE HYDROCHLORIDE 7.5 MG/1
7.5 TABLET ORAL 2 TIMES DAILY
Qty: 90 TABLET | Refills: 3 | Status: SHIPPED | OUTPATIENT
Start: 2024-02-19 | End: 2024-03-06 | Stop reason: SDUPTHER

## 2024-02-29 ENCOUNTER — PATIENT MESSAGE (OUTPATIENT)
Dept: PRIMARY CARE | Facility: CLINIC | Age: 59
End: 2024-02-29
Payer: COMMERCIAL

## 2024-02-29 ENCOUNTER — LAB (OUTPATIENT)
Dept: LAB | Facility: LAB | Age: 59
End: 2024-02-29
Payer: COMMERCIAL

## 2024-02-29 DIAGNOSIS — R53.83 OTHER FATIGUE: Primary | ICD-10-CM

## 2024-02-29 DIAGNOSIS — R53.83 OTHER FATIGUE: ICD-10-CM

## 2024-02-29 LAB
25(OH)D3 SERPL-MCNC: 22 NG/ML (ref 30–100)
BASOPHILS # BLD AUTO: 0.08 X10*3/UL (ref 0–0.1)
BASOPHILS NFR BLD AUTO: 0.9 %
EOSINOPHIL # BLD AUTO: 0.13 X10*3/UL (ref 0–0.7)
EOSINOPHIL NFR BLD AUTO: 1.5 %
ERYTHROCYTE [DISTWIDTH] IN BLOOD BY AUTOMATED COUNT: 13.3 % (ref 11.5–14.5)
HCT VFR BLD AUTO: 41.3 % (ref 36–46)
HGB BLD-MCNC: 13.2 G/DL (ref 12–16)
IMM GRANULOCYTES # BLD AUTO: 0.02 X10*3/UL (ref 0–0.7)
IMM GRANULOCYTES NFR BLD AUTO: 0.2 % (ref 0–0.9)
IRON SATN MFR SERPL: 13 % (ref 25–45)
IRON SERPL-MCNC: 47 UG/DL (ref 35–150)
LYMPHOCYTES # BLD AUTO: 3.28 X10*3/UL (ref 1.2–4.8)
LYMPHOCYTES NFR BLD AUTO: 37.8 %
MCH RBC QN AUTO: 29.2 PG (ref 26–34)
MCHC RBC AUTO-ENTMCNC: 32 G/DL (ref 32–36)
MCV RBC AUTO: 91 FL (ref 80–100)
MONOCYTES # BLD AUTO: 0.56 X10*3/UL (ref 0.1–1)
MONOCYTES NFR BLD AUTO: 6.5 %
NEUTROPHILS # BLD AUTO: 4.61 X10*3/UL (ref 1.2–7.7)
NEUTROPHILS NFR BLD AUTO: 53.1 %
NRBC BLD-RTO: 0 /100 WBCS (ref 0–0)
PLATELET # BLD AUTO: 348 X10*3/UL (ref 150–450)
RBC # BLD AUTO: 4.52 X10*6/UL (ref 4–5.2)
T3 SERPL-MCNC: 97 NG/DL (ref 60–200)
T4 FREE SERPL-MCNC: 0.7 NG/DL (ref 0.61–1.12)
TIBC SERPL-MCNC: 369 UG/DL (ref 240–445)
TSH SERPL-ACNC: 2.8 MIU/L (ref 0.44–3.98)
UIBC SERPL-MCNC: 322 UG/DL (ref 110–370)
VIT B12 SERPL-MCNC: 404 PG/ML (ref 211–911)
WBC # BLD AUTO: 8.7 X10*3/UL (ref 4.4–11.3)

## 2024-02-29 PROCEDURE — 83540 ASSAY OF IRON: CPT

## 2024-02-29 PROCEDURE — 82306 VITAMIN D 25 HYDROXY: CPT

## 2024-02-29 PROCEDURE — 82607 VITAMIN B-12: CPT

## 2024-02-29 PROCEDURE — 85025 COMPLETE CBC W/AUTO DIFF WBC: CPT

## 2024-02-29 PROCEDURE — 83550 IRON BINDING TEST: CPT

## 2024-02-29 PROCEDURE — 84439 ASSAY OF FREE THYROXINE: CPT

## 2024-02-29 PROCEDURE — 84443 ASSAY THYROID STIM HORMONE: CPT

## 2024-02-29 PROCEDURE — 84480 ASSAY TRIIODOTHYRONINE (T3): CPT

## 2024-02-29 PROCEDURE — 36415 COLL VENOUS BLD VENIPUNCTURE: CPT

## 2024-03-02 DIAGNOSIS — E55.9 VITAMIN D DEFICIENCY: Primary | ICD-10-CM

## 2024-03-02 RX ORDER — ERGOCALCIFEROL 1.25 MG/1
50000 CAPSULE ORAL
Qty: 12 CAPSULE | Refills: 0 | Status: SHIPPED | OUTPATIENT
Start: 2024-03-02 | End: 2024-05-25

## 2024-03-06 ENCOUNTER — OFFICE VISIT (OUTPATIENT)
Dept: PRIMARY CARE | Facility: CLINIC | Age: 59
End: 2024-03-06
Payer: COMMERCIAL

## 2024-03-06 VITALS
OXYGEN SATURATION: 98 % | SYSTOLIC BLOOD PRESSURE: 120 MMHG | RESPIRATION RATE: 16 BRPM | BODY MASS INDEX: 36.9 KG/M2 | HEART RATE: 79 BPM | DIASTOLIC BLOOD PRESSURE: 80 MMHG | WEIGHT: 215 LBS

## 2024-03-06 DIAGNOSIS — Z12.31 ENCOUNTER FOR SCREENING MAMMOGRAM FOR MALIGNANT NEOPLASM OF BREAST: Primary | ICD-10-CM

## 2024-03-06 DIAGNOSIS — F41.9 ANXIETY: ICD-10-CM

## 2024-03-06 PROCEDURE — 1036F TOBACCO NON-USER: CPT | Performed by: INTERNAL MEDICINE

## 2024-03-06 PROCEDURE — 99214 OFFICE O/P EST MOD 30 MIN: CPT | Performed by: INTERNAL MEDICINE

## 2024-03-06 RX ORDER — BUSPIRONE HYDROCHLORIDE 10 MG/1
10 TABLET ORAL 2 TIMES DAILY
Qty: 60 TABLET | Refills: 3 | Status: SHIPPED | OUTPATIENT
Start: 2024-03-06 | End: 2024-05-16

## 2024-03-06 ASSESSMENT — ENCOUNTER SYMPTOMS
ARTHRALGIAS: 1
FATIGUE: 1
NERVOUS/ANXIOUS: 1
MYALGIAS: 1

## 2024-03-06 NOTE — PROGRESS NOTES
Patient here for a follow up    Subjective   Patient ID: Julia Gandhi is a 59 y.o. female who presents for Follow-up.    The patient reports increased anxiety and fatigue since her last visit.  This is associated with mild to moderate myalgia and arthralgia.  She is currently maintained on buspirone 7.5mg once nightly, as she is concerned about taking the medication during the day and driving.  The patient is planning to start Vitamin D2 as 50,000 units once weekly and is optimistic that this will help with fatigue.    The patient maintains an active lifestyle and exercises 30 to 40 minutes daily on her treadmill.  She is concerned that weight loss has plateaued despite her best efforts.       Review of Systems   Constitutional:  Positive for fatigue.   Musculoskeletal:  Positive for arthralgias and myalgias.   Psychiatric/Behavioral:  The patient is nervous/anxious.      Objective   Physical Exam  Constitutional:       Appearance: Normal appearance.   Neck:      Vascular: No carotid bruit.   Cardiovascular:      Rate and Rhythm: Normal rate and regular rhythm.      Heart sounds: Normal heart sounds.   Pulmonary:      Effort: Pulmonary effort is normal.      Breath sounds: Normal breath sounds.   Abdominal:      General: Bowel sounds are normal.      Palpations: Abdomen is soft.      Tenderness: There is no abdominal tenderness.   Skin:     General: Skin is warm and dry.   Neurological:      General: No focal deficit present.      Mental Status: She is alert and oriented to person, place, and time. Mental status is at baseline.   Psychiatric:         Mood and Affect: Mood normal.         Behavior: Behavior normal.       Assessment/Plan   Problem List Items Addressed This Visit             ICD-10-CM    Anxiety F41.9    Relevant Medications    busPIRone (Buspar) 10 mg tablet     Other Visit Diagnoses         Codes    Encounter for screening mammogram for malignant neoplasm of breast    -  Primary Z12.31     Relevant Orders    BI mammo bilateral screening tomosynthesis            IMPRESSIONS/PLAN:     Anxiety/Grieving   - Increasing symptoms.  Increased buspirone to 10mg every day (she is only taking once nightly and this is working well).  She has a low dose of Alprazolam on hand if needed.     /80 in the office today.     HLD   - Cholesterol, LDL borderline high per 8/2023 lipid panel, patient advised to continue to lower levels through healthy diet and regular physical activity.  She has been doing excellent with exercise.  ASCVD 2.5% per 2/2024.        Anxiety   - Feels like this is a bit worse.  Continue alprazolam 0.25mg as needed for worsening anxiety. Advised not to drive while on this medication or mix with alcohol. OARRS UTD. Substance controlled contract UTD. Drug screen UTD.      Insomnia   - Continue Melatonin Nature's Bounty to help with both falling asleep and staying asleep.     Migraine   - Takes sumatriptan 6mg/0.5mL auto-injector, 0.5mL as needed at onset of migraine.         Vitamin D Deficiency   - Takes Vitamin D 50mcg QD.  Last Vitamin D low at 22 per 2/29/2024.     Women's Wellness  - Following with  from Obstetrics/Gynecology.     Health Maintenance   - Routine labs 8/2023. Last Mammogram 1/2023, ordered repeat for 2024. Last colonoscopy 10/2022, repeat due 10/2027.      Follow up according to routine health maintenance, call sooner if needed.        Scribe Attestation  By signing my name below, I, Neisha Eugene   attest that this documentation has been prepared under the direction and in the presence of Emile Israel DO.   Heidi Guy 03/06/24 8:10 AM

## 2024-03-18 ENCOUNTER — PATIENT MESSAGE (OUTPATIENT)
Dept: PRIMARY CARE | Facility: CLINIC | Age: 59
End: 2024-03-18
Payer: COMMERCIAL

## 2024-03-18 DIAGNOSIS — F41.9 ANXIETY: ICD-10-CM

## 2024-03-18 RX ORDER — ALPRAZOLAM 0.25 MG/1
0.25 TABLET ORAL NIGHTLY PRN
Qty: 30 TABLET | Refills: 0 | Status: SHIPPED | OUTPATIENT
Start: 2024-03-18 | End: 2024-04-12 | Stop reason: SDUPTHER

## 2024-04-12 DIAGNOSIS — F41.9 ANXIETY: ICD-10-CM

## 2024-04-12 RX ORDER — ALPRAZOLAM 0.25 MG/1
0.25 TABLET ORAL NIGHTLY PRN
Qty: 30 TABLET | Refills: 0 | Status: SHIPPED | OUTPATIENT
Start: 2024-04-12 | End: 2024-05-03 | Stop reason: SDUPTHER

## 2024-05-02 ENCOUNTER — PATIENT MESSAGE (OUTPATIENT)
Dept: PRIMARY CARE | Facility: CLINIC | Age: 59
End: 2024-05-02
Payer: COMMERCIAL

## 2024-05-02 DIAGNOSIS — E55.9 VITAMIN D DEFICIENCY: Primary | ICD-10-CM

## 2024-05-02 DIAGNOSIS — F41.9 ANXIETY: ICD-10-CM

## 2024-05-03 RX ORDER — ALPRAZOLAM 0.25 MG/1
0.25 TABLET ORAL NIGHTLY PRN
Qty: 30 TABLET | Refills: 0 | Status: SHIPPED | OUTPATIENT
Start: 2024-05-03 | End: 2024-06-06 | Stop reason: SDUPTHER

## 2024-05-15 DIAGNOSIS — F41.9 ANXIETY: ICD-10-CM

## 2024-05-16 RX ORDER — BUSPIRONE HYDROCHLORIDE 10 MG/1
10 TABLET ORAL 2 TIMES DAILY
Qty: 60 TABLET | Refills: 0 | Status: SHIPPED | OUTPATIENT
Start: 2024-05-16

## 2024-05-17 ENCOUNTER — PATIENT MESSAGE (OUTPATIENT)
Dept: PRIMARY CARE | Facility: CLINIC | Age: 59
End: 2024-05-17
Payer: COMMERCIAL

## 2024-05-20 DIAGNOSIS — G43.809 OTHER MIGRAINE WITHOUT STATUS MIGRAINOSUS, NOT INTRACTABLE: ICD-10-CM

## 2024-05-20 RX ORDER — CEFUROXIME AXETIL 250 MG/1
6 TABLET ORAL AS NEEDED
Qty: 3 ML | Refills: 2 | Status: SHIPPED | OUTPATIENT
Start: 2024-05-20 | End: 2024-05-24 | Stop reason: SDUPTHER

## 2024-05-20 NOTE — TELEPHONE ENCOUNTER
From: Julia Gandhi  To: Emile Israel  Sent: 5/17/2024 6:54 PM EDT  Subject: Need refill on Sumatriptan    Hi. Need prescription for Sumatriptan 6 mg/.5 ml injection STATdose refills (quantity of 3 boxes of 2). Are you able to make sure I can refill several times? Kindly send to [facility]. Thank you! I thought I had refills left on my old prescription, but they’re telling me there’s nothing left.

## 2024-05-24 DIAGNOSIS — G43.809 OTHER MIGRAINE WITHOUT STATUS MIGRAINOSUS, NOT INTRACTABLE: ICD-10-CM

## 2024-05-24 RX ORDER — CEFUROXIME AXETIL 250 MG/1
6 TABLET ORAL AS NEEDED
Qty: 3 ML | Refills: 2 | Status: SHIPPED | OUTPATIENT
Start: 2024-05-24

## 2024-05-28 ENCOUNTER — LAB (OUTPATIENT)
Dept: LAB | Facility: LAB | Age: 59
End: 2024-05-28
Payer: COMMERCIAL

## 2024-05-28 DIAGNOSIS — E55.9 VITAMIN D DEFICIENCY: ICD-10-CM

## 2024-05-28 PROCEDURE — 82306 VITAMIN D 25 HYDROXY: CPT

## 2024-05-28 PROCEDURE — 36415 COLL VENOUS BLD VENIPUNCTURE: CPT

## 2024-05-29 LAB — 25(OH)D3 SERPL-MCNC: 64 NG/ML (ref 30–100)

## 2024-06-06 ENCOUNTER — LAB (OUTPATIENT)
Dept: LAB | Facility: LAB | Age: 59
End: 2024-06-06
Payer: COMMERCIAL

## 2024-06-06 ENCOUNTER — OFFICE VISIT (OUTPATIENT)
Dept: PRIMARY CARE | Facility: CLINIC | Age: 59
End: 2024-06-06
Payer: COMMERCIAL

## 2024-06-06 VITALS
SYSTOLIC BLOOD PRESSURE: 126 MMHG | OXYGEN SATURATION: 97 % | HEART RATE: 94 BPM | DIASTOLIC BLOOD PRESSURE: 80 MMHG | BODY MASS INDEX: 37.08 KG/M2 | TEMPERATURE: 98 F | WEIGHT: 216 LBS | RESPIRATION RATE: 16 BRPM

## 2024-06-06 DIAGNOSIS — Z79.899 MEDICATION MANAGEMENT: ICD-10-CM

## 2024-06-06 DIAGNOSIS — Z79.899 MEDICATION MANAGEMENT: Primary | ICD-10-CM

## 2024-06-06 DIAGNOSIS — F41.9 ANXIETY: ICD-10-CM

## 2024-06-06 LAB
AMPHETAMINES UR QL SCN: NORMAL
BARBITURATES UR QL SCN: NORMAL
BZE UR QL SCN: NORMAL
CANNABINOIDS UR QL SCN: NORMAL
CREAT UR-MCNC: 222.6 MG/DL (ref 20–320)
PCP UR QL SCN: NORMAL

## 2024-06-06 PROCEDURE — 82570 ASSAY OF URINE CREATININE: CPT

## 2024-06-06 PROCEDURE — 80373 DRUG SCREENING TRAMADOL: CPT

## 2024-06-06 PROCEDURE — 80307 DRUG TEST PRSMV CHEM ANLYZR: CPT

## 2024-06-06 PROCEDURE — 80358 DRUG SCREENING METHADONE: CPT

## 2024-06-06 PROCEDURE — 99214 OFFICE O/P EST MOD 30 MIN: CPT | Performed by: INTERNAL MEDICINE

## 2024-06-06 PROCEDURE — 80346 BENZODIAZEPINES1-12: CPT

## 2024-06-06 PROCEDURE — 80368 SEDATIVE HYPNOTICS: CPT

## 2024-06-06 PROCEDURE — 80361 OPIATES 1 OR MORE: CPT

## 2024-06-06 PROCEDURE — 80354 DRUG SCREENING FENTANYL: CPT

## 2024-06-06 PROCEDURE — 1036F TOBACCO NON-USER: CPT | Performed by: INTERNAL MEDICINE

## 2024-06-06 PROCEDURE — 80365 DRUG SCREENING OXYCODONE: CPT

## 2024-06-06 RX ORDER — ALPRAZOLAM 0.25 MG/1
0.25 TABLET ORAL NIGHTLY PRN
Qty: 30 TABLET | Refills: 2 | Status: SHIPPED | OUTPATIENT
Start: 2024-06-06 | End: 2024-09-04

## 2024-06-06 ASSESSMENT — ENCOUNTER SYMPTOMS
DIARRHEA: 0
NERVOUS/ANXIOUS: 1
ABDOMINAL PAIN: 0
CONSTIPATION: 0

## 2024-06-06 ASSESSMENT — PATIENT HEALTH QUESTIONNAIRE - PHQ9
2. FEELING DOWN, DEPRESSED OR HOPELESS: NOT AT ALL
SUM OF ALL RESPONSES TO PHQ9 QUESTIONS 1 AND 2: 0
1. LITTLE INTEREST OR PLEASURE IN DOING THINGS: NOT AT ALL

## 2024-06-06 NOTE — PROGRESS NOTES
Patient here for a 3 month follow up     Subjective   Patient ID: Julia Gandhi is a 59 y.o. female who presents for Follow-up.    The patient reports increased anxiety in 4/2024 related to a stressful business deal with a .  She ended up taking more alprazolam 0.25mg than baseline for a few days which helped.     The patient mentions cold symptoms with onset 5/24/2024 and subsiding 6/2/2024.  She notes mild residual symptoms though these are improving as well.  The patient asks if its OK to take Sudafed over the counter if needed.    The patient has been taking Vitamin D supplementation as prescribed, and finds that this has significantly helped with fatigue, energy levels, and myalgia. She inquires whether she should continue with taking Vitamin D.    The patient continues to follow with  from Ophthalmology, and notes that the condition is stable.    The patient has been eating a hard boiled egg each night for additional protein.  She denies any abdominal pain or bowel problems.         Review of Systems   Gastrointestinal:  Negative for abdominal pain, constipation and diarrhea.   Psychiatric/Behavioral:  The patient is nervous/anxious.        Objective   Physical Exam  Constitutional:       Appearance: Normal appearance.   HENT:      Mouth/Throat:      Comments: Visible postnasal drip on exam.   Neck:      Vascular: No carotid bruit.   Cardiovascular:      Rate and Rhythm: Normal rate and regular rhythm.      Heart sounds: Normal heart sounds.   Pulmonary:      Effort: Pulmonary effort is normal.      Breath sounds: Normal breath sounds.   Abdominal:      General: Bowel sounds are normal.      Palpations: Abdomen is soft.      Tenderness: There is no abdominal tenderness.   Skin:     General: Skin is warm and dry.   Neurological:      General: No focal deficit present.      Mental Status: She is alert and oriented to person, place, and time. Mental status is at baseline.   Psychiatric:          Mood and Affect: Mood normal.         Behavior: Behavior normal.         Assessment/Plan   Problem List Items Addressed This Visit             ICD-10-CM    Anxiety F41.9    Relevant Medications    ALPRAZolam (Xanax) 0.25 mg tablet     Other Visit Diagnoses         Codes    Medication management    -  Primary Z79.899    Relevant Orders    Opiate/Opioid/Benzo Prescription Compliance              IMPRESSIONS/PLAN:     Situational Anxiety   - Feels like this is a bit worse.  Refilled alprazolam 0.25mg as needed for worsening anxiety. Advised not to drive while on this medication or mix with alcohol. [place] reviewed. Substance controlled contract signed. Drug screen ordered.      /80 in the office today.     HLD   - Cholesterol, LDL borderline high per 8/2023 lipid panel, patient advised to continue to lower levels through healthy diet and regular physical activity.  Advised patient to limit frequency of eating hard boiled eggs to a few times a week to avoid increase in cholesterol.  She has been doing excellent with exercise.  ASCVD 2.3% per 6/2024.        Insomnia   - Continue Melatonin Nature's Bounty to help with both falling asleep and staying asleep.     Anxiety/Grieving   - Stable.  Continue increased buspirone to 10mg every day (she is only taking once nightly and this is working well).  She has a low dose of Alprazolam on hand if needed.    Migraine   - Takes sumatriptan 6mg/0.5mL auto-injector, 0.5mL as needed at onset of migraine.         Vitamin D Deficiency   - Takes Vitamin D 50mcg QD.  Last Vitamin D wnl 64 per 5/2024.     Women's Wellness  - Following with  from Obstetrics/Gynecology.     Health Maintenance   - Routine labs 8/2023, will repeat prior to next visit. Last Pap 3/2024.  .Last Mammogram 4/2024. Last colonoscopy 10/2022, repeat due 10/2027.      Follow up according to routine health maintenance, call sooner if needed.        Scribe Attestation  By signing my name below,  I, Neisha Eugene   attest that this documentation has been prepared under the direction and in the presence of Emile Israel DO.   Heidi Guy 06/06/24 8:02 AM    no difficulties

## 2024-06-11 LAB
1OH-MIDAZOLAM UR CFM-MCNC: <25 NG/ML
6MAM UR CFM-MCNC: <25 NG/ML
7AMINOCLONAZEPAM UR CFM-MCNC: <25 NG/ML
A-OH ALPRAZ UR CFM-MCNC: 150 NG/ML
ALPRAZ UR CFM-MCNC: 46 NG/ML
CHLORDIAZEP UR CFM-MCNC: <25 NG/ML
CLONAZEPAM UR CFM-MCNC: <25 NG/ML
CODEINE UR CFM-MCNC: <50 NG/ML
DIAZEPAM UR CFM-MCNC: <25 NG/ML
EDDP UR CFM-MCNC: <25 NG/ML
FENTANYL UR CFM-MCNC: <2.5 NG/ML
HYDROCODONE CTO UR CFM-MCNC: <25 NG/ML
HYDROMORPHONE UR CFM-MCNC: <25 NG/ML
LORAZEPAM UR CFM-MCNC: <25 NG/ML
METHADONE UR CFM-MCNC: <25 NG/ML
MIDAZOLAM UR CFM-MCNC: <25 NG/ML
MORPHINE UR CFM-MCNC: <50 NG/ML
NORDIAZEPAM UR CFM-MCNC: <25 NG/ML
NORFENTANYL UR CFM-MCNC: <2.5 NG/ML
NORHYDROCODONE UR CFM-MCNC: <25 NG/ML
NOROXYCODONE UR CFM-MCNC: <25 NG/ML
NORTRAMADOL UR-MCNC: <50 NG/ML
OXAZEPAM UR CFM-MCNC: <25 NG/ML
OXYCODONE UR CFM-MCNC: <25 NG/ML
OXYMORPHONE UR CFM-MCNC: <25 NG/ML
TEMAZEPAM UR CFM-MCNC: <25 NG/ML
TRAMADOL UR CFM-MCNC: <50 NG/ML
ZOLPIDEM UR CFM-MCNC: <25 NG/ML
ZOLPIDEM UR-MCNC: <25 NG/ML

## 2024-06-13 DIAGNOSIS — F41.9 ANXIETY: ICD-10-CM

## 2024-06-14 RX ORDER — BUSPIRONE HYDROCHLORIDE 10 MG/1
10 TABLET ORAL 2 TIMES DAILY
Qty: 60 TABLET | Refills: 5 | Status: SHIPPED | OUTPATIENT
Start: 2024-06-14

## 2024-07-31 DIAGNOSIS — G43.809 OTHER MIGRAINE WITHOUT STATUS MIGRAINOSUS, NOT INTRACTABLE: ICD-10-CM

## 2024-07-31 RX ORDER — CEFUROXIME AXETIL 250 MG/1
6 TABLET ORAL AS NEEDED
Qty: 3 ML | Refills: 6 | Status: SHIPPED | OUTPATIENT
Start: 2024-07-31 | End: 2024-08-01 | Stop reason: ALTCHOICE

## 2024-08-01 DIAGNOSIS — G43.809 OTHER MIGRAINE WITHOUT STATUS MIGRAINOSUS, NOT INTRACTABLE: Primary | ICD-10-CM

## 2024-08-01 RX ORDER — SUMATRIPTAN SUCCINATE 6 MG/.5ML
6 INJECTION SUBCUTANEOUS EVERY 2 HOUR PRN
COMMUNITY
End: 2024-08-01 | Stop reason: SDUPTHER

## 2024-08-01 RX ORDER — SUMATRIPTAN SUCCINATE 6 MG/.5ML
6 INJECTION SUBCUTANEOUS ONCE AS NEEDED
Qty: 3 ML | Refills: 3 | Status: SHIPPED | OUTPATIENT
Start: 2024-08-01

## 2024-08-14 ENCOUNTER — PATIENT MESSAGE (OUTPATIENT)
Dept: PRIMARY CARE | Facility: CLINIC | Age: 59
End: 2024-08-14
Payer: COMMERCIAL

## 2024-08-14 DIAGNOSIS — Z00.00 HEALTHCARE MAINTENANCE: Primary | ICD-10-CM

## 2024-09-11 ENCOUNTER — LAB (OUTPATIENT)
Dept: LAB | Facility: LAB | Age: 59
End: 2024-09-11
Payer: COMMERCIAL

## 2024-09-11 DIAGNOSIS — Z00.00 HEALTHCARE MAINTENANCE: ICD-10-CM

## 2024-09-11 LAB
25(OH)D3 SERPL-MCNC: 55 NG/ML (ref 30–100)
ALBUMIN SERPL BCP-MCNC: 4.2 G/DL (ref 3.4–5)
ALP SERPL-CCNC: 61 U/L (ref 33–110)
ALT SERPL W P-5'-P-CCNC: 11 U/L (ref 7–45)
ANION GAP SERPL CALC-SCNC: 12 MMOL/L (ref 10–20)
AST SERPL W P-5'-P-CCNC: 16 U/L (ref 9–39)
BASOPHILS # BLD AUTO: 0.04 X10*3/UL (ref 0–0.1)
BASOPHILS NFR BLD AUTO: 0.6 %
BILIRUB SERPL-MCNC: 0.9 MG/DL (ref 0–1.2)
BUN SERPL-MCNC: 17 MG/DL (ref 6–23)
CALCIUM SERPL-MCNC: 9.8 MG/DL (ref 8.6–10.6)
CHLORIDE SERPL-SCNC: 106 MMOL/L (ref 98–107)
CHOLEST SERPL-MCNC: 265 MG/DL (ref 0–199)
CHOLESTEROL/HDL RATIO: 3.1
CO2 SERPL-SCNC: 30 MMOL/L (ref 21–32)
CREAT SERPL-MCNC: 1.01 MG/DL (ref 0.5–1.05)
EGFRCR SERPLBLD CKD-EPI 2021: 65 ML/MIN/1.73M*2
EOSINOPHIL # BLD AUTO: 0.19 X10*3/UL (ref 0–0.7)
EOSINOPHIL NFR BLD AUTO: 2.8 %
ERYTHROCYTE [DISTWIDTH] IN BLOOD BY AUTOMATED COUNT: 13.1 % (ref 11.5–14.5)
EST. AVERAGE GLUCOSE BLD GHB EST-MCNC: 105 MG/DL
GLUCOSE SERPL-MCNC: 89 MG/DL (ref 74–99)
HBA1C MFR BLD: 5.3 %
HCT VFR BLD AUTO: 43 % (ref 36–46)
HDLC SERPL-MCNC: 85 MG/DL
HGB BLD-MCNC: 14 G/DL (ref 12–16)
IMM GRANULOCYTES # BLD AUTO: 0.01 X10*3/UL (ref 0–0.7)
IMM GRANULOCYTES NFR BLD AUTO: 0.1 % (ref 0–0.9)
LDLC SERPL CALC-MCNC: 158 MG/DL
LYMPHOCYTES # BLD AUTO: 2.37 X10*3/UL (ref 1.2–4.8)
LYMPHOCYTES NFR BLD AUTO: 35.5 %
MCH RBC QN AUTO: 29.2 PG (ref 26–34)
MCHC RBC AUTO-ENTMCNC: 32.6 G/DL (ref 32–36)
MCV RBC AUTO: 90 FL (ref 80–100)
MONOCYTES # BLD AUTO: 0.45 X10*3/UL (ref 0.1–1)
MONOCYTES NFR BLD AUTO: 6.7 %
NEUTROPHILS # BLD AUTO: 3.61 X10*3/UL (ref 1.2–7.7)
NEUTROPHILS NFR BLD AUTO: 54.3 %
NON HDL CHOLESTEROL: 180 MG/DL (ref 0–149)
NRBC BLD-RTO: 0 /100 WBCS (ref 0–0)
PLATELET # BLD AUTO: 349 X10*3/UL (ref 150–450)
POTASSIUM SERPL-SCNC: 4.3 MMOL/L (ref 3.5–5.3)
PROT SERPL-MCNC: 6.7 G/DL (ref 6.4–8.2)
RBC # BLD AUTO: 4.79 X10*6/UL (ref 4–5.2)
SODIUM SERPL-SCNC: 144 MMOL/L (ref 136–145)
TRIGL SERPL-MCNC: 110 MG/DL (ref 0–149)
TSH SERPL-ACNC: 2.97 MIU/L (ref 0.44–3.98)
VLDL: 22 MG/DL (ref 0–40)
WBC # BLD AUTO: 6.7 X10*3/UL (ref 4.4–11.3)

## 2024-09-11 PROCEDURE — 85025 COMPLETE CBC W/AUTO DIFF WBC: CPT

## 2024-09-11 PROCEDURE — 82306 VITAMIN D 25 HYDROXY: CPT

## 2024-09-11 PROCEDURE — 80053 COMPREHEN METABOLIC PANEL: CPT

## 2024-09-11 PROCEDURE — 80061 LIPID PANEL: CPT

## 2024-09-11 PROCEDURE — 36415 COLL VENOUS BLD VENIPUNCTURE: CPT

## 2024-09-11 PROCEDURE — 83036 HEMOGLOBIN GLYCOSYLATED A1C: CPT

## 2024-09-11 PROCEDURE — 84443 ASSAY THYROID STIM HORMONE: CPT

## 2024-09-16 ENCOUNTER — APPOINTMENT (OUTPATIENT)
Dept: PRIMARY CARE | Facility: CLINIC | Age: 59
End: 2024-09-16
Payer: COMMERCIAL

## 2024-09-16 VITALS
BODY MASS INDEX: 37.39 KG/M2 | SYSTOLIC BLOOD PRESSURE: 120 MMHG | TEMPERATURE: 97.4 F | HEIGHT: 64 IN | DIASTOLIC BLOOD PRESSURE: 80 MMHG | RESPIRATION RATE: 14 BRPM | WEIGHT: 219 LBS | OXYGEN SATURATION: 98 % | HEART RATE: 68 BPM

## 2024-09-16 DIAGNOSIS — F41.9 ANXIETY: ICD-10-CM

## 2024-09-16 DIAGNOSIS — Z00.00 HEALTHCARE MAINTENANCE: Primary | ICD-10-CM
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
## 2024-09-16 PROCEDURE — 3008F BODY MASS INDEX DOCD: CPT | Performed by: INTERNAL MEDICINE

## 2024-09-16 PROCEDURE — 99396 PREV VISIT EST AGE 40-64: CPT | Performed by: INTERNAL MEDICINE

## 2024-09-16 PROCEDURE — 1036F TOBACCO NON-USER: CPT | Performed by: INTERNAL MEDICINE

## 2024-09-16 PROCEDURE — 90656 IIV3 VACC NO PRSV 0.5 ML IM: CPT | Performed by: INTERNAL MEDICINE

## 2024-09-16 PROCEDURE — 90471 IMMUNIZATION ADMIN: CPT | Performed by: INTERNAL MEDICINE

## 2024-09-16 RX ORDER — BUSPIRONE HYDROCHLORIDE 10 MG/1
15 TABLET ORAL 2 TIMES DAILY
Qty: 60 TABLET | Refills: 5 | Status: SHIPPED | OUTPATIENT
Start: 2024-09-16

## 2024-09-16 RX ORDER — IBUPROFEN 600 MG/1
TABLET ORAL
Qty: 60 TABLET | Refills: 3 | Status: SHIPPED | OUTPATIENT
Start: 2024-09-16

## 2024-09-16 ASSESSMENT — PATIENT HEALTH QUESTIONNAIRE - PHQ9
2. FEELING DOWN, DEPRESSED OR HOPELESS: NOT AT ALL
1. LITTLE INTEREST OR PLEASURE IN DOING THINGS: NOT AT ALL
SUM OF ALL RESPONSES TO PHQ9 QUESTIONS 1 AND 2: 0

## 2024-09-16 ASSESSMENT — ENCOUNTER SYMPTOMS
HEADACHES: 1
NERVOUS/ANXIOUS: 1

## 2024-09-16 NOTE — PROGRESS NOTES
Subjective   Patient ID: Julia Gandhi is a 59 y.o. female who presents for her annual exam.    The patient reports ongoing migraines and is taking sumatriptan 6mg/0.5mL auto-injector, 0.5mL as needed at onset which is helping.    The patient mentions some improvement in depression symptoms related to grief.  She is trying to keep busy with household tasks, and finds that symptoms are more pronounced during idle periods. The patient also notes occasional anxiety symptoms which are relatively controlled with buspirone 10mg once daily in the evening around 5pm.  She has been avoiding taking the medication twice daily due to feelings of lightheadedness with a morning dose.    The patient continues to follow with  from Ophthalmology, and wears prescription lenses.    The patient denies any hearing impairment, chest pressure, chest pain, abdominal pain, or bowel problems.       Review of Systems   Cardiovascular:  Negative for chest pain.   Neurological:  Positive for headaches.   Psychiatric/Behavioral:  The patient is nervous/anxious.      Objective   Physical Exam  Constitutional:       Appearance: Normal appearance.   Neck:      Vascular: No carotid bruit.   Cardiovascular:      Rate and Rhythm: Normal rate and regular rhythm.      Heart sounds: Normal heart sounds.   Pulmonary:      Effort: Pulmonary effort is normal.      Breath sounds: Normal breath sounds.   Abdominal:      General: Bowel sounds are normal.      Palpations: Abdomen is soft.      Tenderness: There is no abdominal tenderness.   Skin:     General: Skin is warm and dry.   Neurological:      General: No focal deficit present.      Mental Status: She is alert and oriented to person, place, and time. Mental status is at baseline.   Psychiatric:         Mood and Affect: Mood normal.         Behavior: Behavior normal.         Assessment/Plan   Problem List Items Addressed This Visit             ICD-10-CM    Anxiety F41.9    Relevant  Medications    busPIRone (Buspar) 10 mg tablet     Other Visit Diagnoses         Codes    Fall, initial encounter     W19.XXXA    Relevant Medications    ibuprofen 600 mg tablet            CPE Performed  -  Discussed healthy diet and regular exercise.    -  Physical exam overall unremarkable. Immunizations reviewed and updated accordingly. Healthy lifestyle choices discussed (tobacco avoidance, appropriate alcohol use, avoidance of illicit substances).   -  Patient is wearing seatbelt.   -  Screening lab work ordered as indicated.    -  Age appropriate screening tests reviewed with patient.        IMPRESSIONS/PLAN:     Anxiety/Grieving   - Symptoms ongoing. Increase buspirone to 15mg every day (she is only taking once nightly and this is working well).  She has a low dose of Alprazolam on hand if needed.    BP 98/60 in the office today, 120/80 on repeat.     HLD   - Cholesterol high and LDL borderline high per 9/2024 lipid panel. Patient advised to continue to lower levels through healthy diet and regular physical activity.  Advised patient to limit frequency of eating hard boiled eggs to a few times a week to avoid increase in cholesterol.  She has been doing excellent with exercise.  ASCVD 1.5% per 9/2024.        Insomnia   - Continue Melatonin Nature's Bounty to help with both falling asleep and staying asleep.     Situational Anxiety   - Maintained with alprazolam 0.25mg as needed for worsening anxiety. Advised not to drive while on this medication or mix with alcohol. OAR[place] reviewed. Substance controlled contract signed. Drug screen ordered.      Migraine   - Takes sumatriptan 6mg/0.5mL auto-injector, 0.5mL as needed at onset of migraine.         Vitamin D Deficiency   - Takes Vitamin D 50mcg QD.  Last Vitamin D wnl 64 per 5/2024.     Women's Wellness  - Following with  from Obstetrics/Gynecology.     Health Maintenance   - Routine labs 9/2024, reviewed in detail. Last Pap 3/2024.  Last Mammogram  4/2024. Last colonoscopy 10/2022, repeat due 10/2027. Patient received Influenza vaccine in the clinic today, tolerated well.  Can do EKG next visit.     Follow up according to routine health maintenance, call sooner if needed.        Scribe Attestation  By signing my name below, IHeidi Scribe   attest that this documentation has been prepared under the direction and in the presence of Emile Israel DO.   Heidi Guy 09/16/24 7:57 AM

## 2024-09-26 DIAGNOSIS — F41.9 ANXIETY: ICD-10-CM

## 2024-09-26 RX ORDER — ALPRAZOLAM 0.25 MG/1
0.25 TABLET ORAL NIGHTLY PRN
Qty: 30 TABLET | Refills: 2 | Status: SHIPPED | OUTPATIENT
Start: 2024-09-26 | End: 2024-12-25

## 2024-12-17 ENCOUNTER — APPOINTMENT (OUTPATIENT)
Dept: PRIMARY CARE | Facility: CLINIC | Age: 59
End: 2024-12-17
Payer: COMMERCIAL

## 2024-12-17 VITALS
OXYGEN SATURATION: 97 % | RESPIRATION RATE: 16 BRPM | TEMPERATURE: 96.9 F | DIASTOLIC BLOOD PRESSURE: 70 MMHG | SYSTOLIC BLOOD PRESSURE: 128 MMHG | HEART RATE: 80 BPM

## 2024-12-17 DIAGNOSIS — G43.809 OTHER MIGRAINE WITHOUT STATUS MIGRAINOSUS, NOT INTRACTABLE: Primary | ICD-10-CM

## 2024-12-17 DIAGNOSIS — F41.9 ANXIETY: ICD-10-CM

## 2024-12-17 PROCEDURE — 99214 OFFICE O/P EST MOD 30 MIN: CPT | Performed by: INTERNAL MEDICINE

## 2024-12-17 PROCEDURE — 1036F TOBACCO NON-USER: CPT | Performed by: INTERNAL MEDICINE

## 2024-12-17 RX ORDER — BUSPIRONE HYDROCHLORIDE 10 MG/1
15 TABLET ORAL DAILY
Qty: 60 TABLET | Refills: 5 | Status: SHIPPED | OUTPATIENT
Start: 2024-12-17 | End: 2024-12-17 | Stop reason: DRUGHIGH

## 2024-12-17 RX ORDER — BUSPIRONE HYDROCHLORIDE 10 MG/1
10 TABLET ORAL DAILY
Qty: 60 TABLET | Refills: 5 | Status: SHIPPED | OUTPATIENT
Start: 2024-12-17 | End: 2024-12-19 | Stop reason: SDUPTHER

## 2024-12-17 RX ORDER — ALPRAZOLAM 0.5 MG/1
0.5 TABLET ORAL NIGHTLY PRN
Qty: 30 TABLET | Refills: 2 | Status: SHIPPED | OUTPATIENT
Start: 2024-12-17 | End: 2025-03-17

## 2024-12-17 ASSESSMENT — ENCOUNTER SYMPTOMS
CONSTIPATION: 0
DIARRHEA: 0
ABDOMINAL PAIN: 0
BLOOD IN STOOL: 0
PALPITATIONS: 0

## 2024-12-17 ASSESSMENT — PATIENT HEALTH QUESTIONNAIRE - PHQ9
SUM OF ALL RESPONSES TO PHQ9 QUESTIONS 1 AND 2: 0
2. FEELING DOWN, DEPRESSED OR HOPELESS: NOT AT ALL
1. LITTLE INTEREST OR PLEASURE IN DOING THINGS: NOT AT ALL

## 2024-12-17 NOTE — PROGRESS NOTES
Patient here for a 3 month follow up    Subjective   Patient ID: Julia Gandhi is a 59 y.o. female who presents for Follow-up.    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  She is being managed with surveillance, and will follow-up with her Specialist every two months.     The patient notes ongoing anxiety symptoms, and is currently worried about the condition of her eyes.  She is maintained with buspirone 10mg once nightly, and finds that this regimen is working well.  The patient tried taking 1.5 tablets as previously suggested, but quickly discontinued due to intolerance.  She has occasionally been taking alprazolam 0.25mg as two tablets once nightly, and finds that this is helping mroe.  The patient denies any palpitations, chest pressure, or chest pain.    The patient denies any abdominal pain, hematochezia, melena, or bowel problems.     The patient has two new cats, and finds that this is helping.      Review of Systems   Cardiovascular:  Negative for chest pain and palpitations.   Gastrointestinal:  Negative for abdominal pain, blood in stool, constipation and diarrhea.     Objective   Physical Exam  Constitutional:       Appearance: Normal appearance.   Neck:      Vascular: No carotid bruit.   Cardiovascular:      Rate and Rhythm: Normal rate and regular rhythm.      Heart sounds: Normal heart sounds.   Pulmonary:      Effort: Pulmonary effort is normal.      Breath sounds: Normal breath sounds.   Abdominal:      General: Bowel sounds are normal.      Palpations: Abdomen is soft.      Tenderness: There is no abdominal tenderness.   Skin:     General: Skin is warm and dry.   Neurological:      General: No focal deficit present.      Mental Status: She is alert and oriented to person, place, and time. Mental status is at baseline.   Psychiatric:         Mood and Affect: Mood normal.         Behavior: Behavior normal.          Assessment/Plan   Problem List Items Addressed This Visit             ICD-10-CM    Anxiety F41.9    Relevant Medications    ALPRAZolam (Xanax) 0.5 mg tablet    busPIRone (Buspar) 10 mg tablet       IMPRESSIONS/PLAN:      Anxiety/Grieving   - Symptoms ongoing. Maintained with buspirone to 10mg every day (she is only taking once daily and this is working well).  Refilled alprazolam 0.5mg up to once nightly prn on hand if needed.  OARRS reviewed. Substance controlled contract signed. Drug screen UTD.      /70 in the office today.     HLD   - Cholesterol high and LDL borderline high per 9/2024 lipid panel. Patient advised to continue to lower levels through healthy diet and regular physical activity.  Advised patient to limit frequency of eating hard boiled eggs to a few times a week to avoid increase in cholesterol.  She has been doing excellent with exercise.  ASCVD 1.5% per 9/2024.        Insomnia   - Continue Melatonin Nature's Bounty to help with both falling asleep and staying asleep.     Migraine   - Takes sumatriptan 6mg/0.5mL auto-injector, 0.5mL as needed at onset of migraine.         Vitamin D Deficiency   - Takes Vitamin D 50mcg QD.  Last Vitamin D wnl 64 per 5/2024.     Women's Wellness  - Following with  from Obstetrics/Gynecology.     Health Maintenance   - Routine labs 9/2024, reviewed in detail. Last Pap 3/2024.  Last Mammogram 4/2024. Last colonoscopy 10/2022, repeat due 10/2027. Can do EKG next visit.     Follow up according to routine health maintenance, call sooner if needed.        Scribe Attestation  By signing my name below, I, Heidi Guy, Scribe   attest that this documentation has been prepared under the direction and in the presence of Emile Israel DO.   Heidi Guy 12/17/24 7:58 AM

## 2024-12-19 DIAGNOSIS — F41.9 ANXIETY: ICD-10-CM

## 2024-12-19 DIAGNOSIS — G43.809 OTHER MIGRAINE WITHOUT STATUS MIGRAINOSUS, NOT INTRACTABLE: ICD-10-CM

## 2024-12-19 RX ORDER — SUMATRIPTAN SUCCINATE 6 MG/.5ML
6 INJECTION SUBCUTANEOUS ONCE AS NEEDED
Qty: 3 ML | Refills: 3 | Status: SHIPPED | OUTPATIENT
Start: 2024-12-19

## 2024-12-19 RX ORDER — BUSPIRONE HYDROCHLORIDE 10 MG/1
10 TABLET ORAL DAILY
Qty: 30 TABLET | Refills: 5 | Status: SHIPPED | OUTPATIENT
Start: 2024-12-19

## 2025-02-06 DIAGNOSIS — G43.809 OTHER MIGRAINE WITHOUT STATUS MIGRAINOSUS, NOT INTRACTABLE: ICD-10-CM

## 2025-02-06 RX ORDER — SUMATRIPTAN SUCCINATE 6 MG/.5ML
6 INJECTION, SOLUTION SUBCUTANEOUS ONCE AS NEEDED
Qty: 3 ML | Refills: 3 | Status: SHIPPED | OUTPATIENT
Start: 2025-02-06

## 2025-03-14 DIAGNOSIS — F41.9 ANXIETY: ICD-10-CM

## 2025-03-14 RX ORDER — ALPRAZOLAM 0.5 MG/1
0.5 TABLET ORAL NIGHTLY PRN
Qty: 30 TABLET | Refills: 2 | Status: SHIPPED | OUTPATIENT
Start: 2025-03-14 | End: 2025-06-12

## 2025-03-18 ENCOUNTER — APPOINTMENT (OUTPATIENT)
Dept: PRIMARY CARE | Facility: CLINIC | Age: 60
End: 2025-03-18
Payer: COMMERCIAL

## 2025-03-18 VITALS
SYSTOLIC BLOOD PRESSURE: 125 MMHG | WEIGHT: 235.2 LBS | HEART RATE: 95 BPM | DIASTOLIC BLOOD PRESSURE: 84 MMHG | OXYGEN SATURATION: 99 % | BODY MASS INDEX: 40.15 KG/M2 | TEMPERATURE: 97 F | HEIGHT: 64 IN

## 2025-03-18 DIAGNOSIS — F41.9 ANXIETY: Primary | ICD-10-CM

## 2025-03-18 DIAGNOSIS — E78.2 MIXED HYPERLIPIDEMIA: ICD-10-CM

## 2025-03-18 PROBLEM — R30.0 DYSURIA: Status: ACTIVE | Noted: 2025-03-18

## 2025-03-18 PROBLEM — S61.219A LACERATION OF FINGER: Status: ACTIVE | Noted: 2025-03-18

## 2025-03-18 PROBLEM — R68.83 CHILLS: Status: ACTIVE | Noted: 2025-03-18

## 2025-03-18 PROBLEM — H26.491 POSTERIOR CAPSULAR OPACIFICATION VISUALLY SIGNIFICANT OF RIGHT EYE: Status: ACTIVE | Noted: 2023-10-10

## 2025-03-18 PROBLEM — M79.606 PAIN OF LOWER EXTREMITY: Status: ACTIVE | Noted: 2025-03-18

## 2025-03-18 PROBLEM — E66.9 OBESITY WITH BODY MASS INDEX 30 OR GREATER: Status: ACTIVE | Noted: 2025-03-18

## 2025-03-18 PROBLEM — N39.0 URINARY TRACT INFECTION: Status: ACTIVE | Noted: 2025-03-18

## 2025-03-18 PROBLEM — J02.9 SORE THROAT: Status: ACTIVE | Noted: 2025-03-18

## 2025-03-18 PROCEDURE — 3008F BODY MASS INDEX DOCD: CPT | Performed by: INTERNAL MEDICINE

## 2025-03-18 PROCEDURE — 99213 OFFICE O/P EST LOW 20 MIN: CPT | Performed by: INTERNAL MEDICINE

## 2025-03-18 PROCEDURE — 1036F TOBACCO NON-USER: CPT | Performed by: INTERNAL MEDICINE

## 2025-03-18 NOTE — PROGRESS NOTES
Subjective   Patient ID: Julia Gandhi is a 60 y.o. female who presents for Follow-up.    The patient continues to take buspirone 10mg once daily, and finds that this dosage is working well.  She is maintained with alprazolam 0.5mg once nightly as needed, and states this has been helping her sleep.    The patient is following with Ophthalmology for a number of conditions including bullous retinoschisis.  She is managed with regular injections, and finds that this is helping.    The patient is concerned about difficulty with losing weight, and inquires whether GLP-1 agonists are a good option in her case.  She denies any known personal or family history of thyroid cancer.        Review of Systems   All other systems reviewed and are negative.    Objective   Physical Exam  Constitutional:       Appearance: Normal appearance.   Neck:      Vascular: No carotid bruit.   Cardiovascular:      Rate and Rhythm: Normal rate and regular rhythm.      Heart sounds: Normal heart sounds.   Pulmonary:      Effort: Pulmonary effort is normal.      Breath sounds: Normal breath sounds.   Abdominal:      General: Bowel sounds are normal.      Palpations: Abdomen is soft.      Tenderness: There is no abdominal tenderness.   Skin:     General: Skin is warm and dry.   Neurological:      General: No focal deficit present.      Mental Status: She is alert and oriented to person, place, and time. Mental status is at baseline.   Psychiatric:         Mood and Affect: Mood normal.         Behavior: Behavior normal.         Assessment/Plan   Problem List Items Addressed This Visit    None      IMPRESSIONS/PLAN:      /84 in the office today.     HLD   - Cholesterol high and LDL borderline high per 9/2024 lipid panel. Patient advised to continue to lower levels through healthy diet and regular physical activity.  Advised patient to limit frequency of eating hard boiled eggs to a few times a week to avoid increase in cholesterol.  She  has been doing excellent with exercise.  ASCVD 2.7% per 3/2025.        Anxiety/Grieving   - Symptoms ongoing. Maintained with buspirone to 10mg every day (she is only taking once daily and this is working well).  Refilled alprazolam 0.5mg up to once nightly prn on hand if needed.  OARRS reviewed. Substance controlled contract signed. Drug screen UTD.  Will repeat Drug Screen next visit.      Insomnia   - Continue Melatonin Nature's Bounty to help with both falling asleep and staying asleep.     Migraine   - Takes sumatriptan 6mg/0.5mL auto-injector, 0.5mL as needed at onset of migraine.    Women's Wellness  - Following with  from Obstetrics/Gynecology.    Vitamin D Deficiency   - Takes Vitamin D 50mcg QD.  Last Vitamin D wnl 64 per 5/2024.     Health Maintenance   - Routine labs 9/2024, reviewed in detail. Last Pap 3/2024.  Last Mammogram 4/2024. Last colonoscopy 10/2022, repeat due 10/2027. Can do EKG next visit.  Discussed GLP-1 agonists, and patient will consider for now.     Follow up according to routine health maintenance, call sooner if needed.        Scribe Attestation  By signing my name below, I, Neisha Eugene   attest that this documentation has been prepared under the direction and in the presence of Emile Israel DO.   Heidi Guy 03/18/25 8:33 AM

## 2025-06-08 DIAGNOSIS — F41.9 ANXIETY: ICD-10-CM

## 2025-06-09 ENCOUNTER — APPOINTMENT (OUTPATIENT)
Dept: PRIMARY CARE | Facility: CLINIC | Age: 60
End: 2025-06-09
Payer: COMMERCIAL

## 2025-06-09 VITALS
HEART RATE: 103 BPM | RESPIRATION RATE: 16 BRPM | OXYGEN SATURATION: 98 % | DIASTOLIC BLOOD PRESSURE: 70 MMHG | TEMPERATURE: 97.6 F | SYSTOLIC BLOOD PRESSURE: 128 MMHG

## 2025-06-09 DIAGNOSIS — H33.329 RETINAL HOLE, UNSPECIFIED LATERALITY: ICD-10-CM

## 2025-06-09 DIAGNOSIS — F41.9 ANXIETY: Primary | ICD-10-CM
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
## 2025-06-09 PROCEDURE — 1036F TOBACCO NON-USER: CPT | Performed by: INTERNAL MEDICINE

## 2025-06-09 PROCEDURE — 99214 OFFICE O/P EST MOD 30 MIN: CPT | Performed by: INTERNAL MEDICINE

## 2025-06-09 RX ORDER — IBUPROFEN 600 MG/1
TABLET, FILM COATED ORAL
Qty: 60 TABLET | Refills: 3 | Status: SHIPPED | OUTPATIENT
Start: 2025-06-09

## 2025-06-09 RX ORDER — BUSPIRONE HYDROCHLORIDE 10 MG/1
10 TABLET ORAL DAILY
Qty: 30 TABLET | Refills: 5 | Status: SHIPPED | OUTPATIENT
Start: 2025-06-09

## 2025-06-09 RX ORDER — ALPRAZOLAM 0.5 MG/1
0.5 TABLET ORAL 2 TIMES DAILY PRN
Qty: 60 TABLET | Refills: 0 | Status: SHIPPED | OUTPATIENT
Start: 2025-06-09 | End: 2025-09-07

## 2025-06-09 NOTE — PROGRESS NOTES
Patient here for a follow up    Subjective   Patient ID: Julia Gandhi is a 60 y.o. female who presents for Follow-up.  The patient is scheduled for upcoming revision retinal surgery on 6/11/2025.  She underwent the original procedure on 4/30/2025.  The patient experienced significant anxiety with the prior surgery, and found that alprazolam was helpful with controlling symptoms.  This mostly stemmed from having to lie face down for 7 days after the surgery.  She requests a refill of the medication and would like to increase the frequency if possible with the upcoming procedure.  The patient continues to take buspirone 10mg once daily, and finds that the medication is working well.  She continues to follow with  from Ophthalmology.     The patient states that she will have sit in a specific position following the above mentioned surgery, and recalls experiencing back pain while recovering from the eye surgery in 4/2025.  The patient requests a prescription for ibuprofen 600mg, as this was helpful in the past.        Review of Systems   All other systems reviewed and are negative.      Objective   Physical Exam  Constitutional:       Appearance: Normal appearance.   Neck:      Vascular: No carotid bruit.   Cardiovascular:      Rate and Rhythm: Normal rate and regular rhythm.      Heart sounds: Normal heart sounds.   Pulmonary:      Effort: Pulmonary effort is normal.      Breath sounds: Normal breath sounds.   Abdominal:      General: Bowel sounds are normal.      Palpations: Abdomen is soft.      Tenderness: There is no abdominal tenderness.   Skin:     General: Skin is warm and dry.   Neurological:      General: No focal deficit present.      Mental Status: She is alert and oriented to person, place, and time. Mental status is at baseline.   Psychiatric:         Mood and Affect: Mood normal.         Behavior: Behavior normal.       Assessment/Plan   Problem List Items Addressed This Visit            "ICD-10-CM    Anxiety F41.9    Relevant Medications    ALPRAZolam (Xanax) 0.5 mg tablet     Other Visit Diagnoses         Codes      Fall, initial encounter     W19.XXXA    Relevant Medications    ibuprofen 600 mg tablet            IMPRESSIONS/PLAN:    Back Pain  - Prescribed ibuprofen 600mg up to three times daily as needed.  Advised patient to try and alternate medication with Tylenol OTC as directed on packaging.  Also recommended patient try heating pads, and Salonpas patches for additional relief.  Call the clinic if symptoms persist or worsen.     Anxiety  - Symptoms ongoing. Refilled alprazolam 0.5mg up to twice daily prn on hand if needed.  Maintained with buspirone to 10mg every day (she is only taking once daily and this is working well).  OARRS reviewed. Substance controlled contract signed. Drug screen UTD.  Will repeat Drug Screen next visit.      /70 in the office today.     HLD   - Cholesterol high and LDL borderline high per 9/2024 lipid panel. Patient advised to continue to lower levels through healthy diet and regular physical activity.  Advised patient to limit frequency of eating hard boiled eggs to a few times a week to avoid increase in cholesterol.  She has been doing excellent with exercise.  ASCVD 2.7% per 3/2025.           Insomnia   - Continue Melatonin Nature's Bounty to help with both falling asleep and staying asleep.     Migraine   - Takes sumatriptan 6mg/0.5mL auto-injector, 0.5mL as needed at onset of migraine.     Women's Wellness  - Following with  from Obstetrics/Gynecology.     Vitamin D Deficiency   - Takes Vitamin D 50mcg QD.  Last Vitamin D wnl 64 per 5/2024.     Health Maintenance   - Routine labs 9/2024, reviewed in detail. Last Pap 3/2024.  Last Mammogram 4/2024. Last colonoscopy 10/2022, repeat due 10/2027. Can do EKG next visit.       Follow up according to routine health maintenance, call sooner if needed.        \"I, Dr. Israel, personally performed the " services described in the documentation as scribed by Heidi Guy in my presence, and confirm it is both accurate and complete.   Scribe Attestation     Scribe Attestation  By signing my name below, I, Heidi Gyu, Scralethea   attest that this documentation has been prepared under the direction and in the presence of Emile Israel DO.   Heidi Guy 06/09/25 2:55 PM

## 2025-06-19 ENCOUNTER — APPOINTMENT (OUTPATIENT)
Dept: PRIMARY CARE | Facility: CLINIC | Age: 60
End: 2025-06-19
Payer: COMMERCIAL

## 2025-06-20 ENCOUNTER — APPOINTMENT (OUTPATIENT)
Dept: PRIMARY CARE | Facility: CLINIC | Age: 60
End: 2025-06-20
Payer: COMMERCIAL

## 2025-07-16 ENCOUNTER — PATIENT MESSAGE (OUTPATIENT)
Dept: PRIMARY CARE | Facility: CLINIC | Age: 60
End: 2025-07-16
Payer: COMMERCIAL

## 2025-07-16 DIAGNOSIS — F41.9 ANXIETY: ICD-10-CM

## 2025-07-18 ENCOUNTER — OFFICE VISIT (OUTPATIENT)
Dept: PRIMARY CARE | Facility: CLINIC | Age: 60
End: 2025-07-18
Payer: COMMERCIAL

## 2025-07-18 VITALS
HEART RATE: 102 BPM | OXYGEN SATURATION: 98 % | DIASTOLIC BLOOD PRESSURE: 72 MMHG | SYSTOLIC BLOOD PRESSURE: 128 MMHG | TEMPERATURE: 98.6 F | HEIGHT: 64 IN | BODY MASS INDEX: 40.37 KG/M2
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
## 2025-07-18 RX ORDER — METRONIDAZOLE 500 MG/1
500 TABLET ORAL 3 TIMES DAILY
Qty: 21 TABLET | Refills: 0 | Status: SHIPPED | OUTPATIENT
Start: 2025-07-18 | End: 2025-07-25

## 2025-07-18 RX ORDER — MUPIROCIN 20 MG/G
OINTMENT TOPICAL 3 TIMES DAILY
Qty: 22 G | Refills: 0 | Status: SHIPPED | OUTPATIENT
Start: 2025-07-18 | End: 2025-07-28

## 2025-07-18 RX ORDER — DOXYCYCLINE 100 MG/1
100 CAPSULE ORAL 2 TIMES DAILY
Qty: 14 CAPSULE | Refills: 0 | Status: SHIPPED | OUTPATIENT
Start: 2025-07-18 | End: 2025-07-25

## 2025-07-18 RX ORDER — ALPRAZOLAM 0.5 MG/1
0.5 TABLET ORAL 2 TIMES DAILY PRN
Qty: 60 TABLET | Refills: 0 | Status: SHIPPED | OUTPATIENT
Start: 2025-07-18 | End: 2025-10-16

## 2025-07-18 NOTE — PROGRESS NOTES
Subjective   Patient ID: Julia Gandhi is a 60 y.o. female who presents for Animal Bite (She had her own cat bite  her one cat freaked out and she ended up getting bit and scratch, on right  arm and scratched left arm).    The patient reports scratches on her arms bilaterally, and a bite on the right arm sustained this morning from her own cat.  She recalls experiencing hives in the past while in highschool after taking penicillin, and has not tried the antibiotic since then.  The patient also reports an allergy to sulfa medications and ciprofloxacin.      The patient is recovering well from recent Ophthalmic surgery.    The patient does not drink alcohol.    Animal Bite       Review of Systems   Skin:         Positive for feline scratch and bite marks.     Objective   Physical Exam  Constitutional:       Appearance: Normal appearance.   Neck:      Vascular: No carotid bruit.   Abdominal:      General: Bowel sounds are normal.      Palpations: Abdomen is soft.      Tenderness: There is no abdominal tenderness.     Skin:     General: Skin is warm and dry.      Comments: 2 small puncture wounds on right forearm.  Multiple scratches on either forearm and both feet.  No signs of lymphangitis.       Neurological:      General: No focal deficit present.      Mental Status: She is alert and oriented to person, place, and time. Mental status is at baseline.     Psychiatric:         Mood and Affect: Mood normal.         Behavior: Behavior normal.       Assessment/Plan   Problem List Items Addressed This Visit    None      IMPRESSIONS/PLAN:    Cat Bite  - No signs of lymphangitis. Applied topical antibiotic to scratch marks.  Prescribed doxycycline 100 mg BID to be taken with food, and avoid direct exposure to sunlight as well as metronidazole 500mg TID to be taken with food.  Also prescribed mupirocin 2%  to be applied three times daily for 10 days.  Instructed patient not to take with alcohol and she verbalized  "understanding.  Keep the area dry and clean with Dial soap and warm water.  Advised patient to head to the ED with any fever, chills, worsening pain or red streak marks up the extremity and she verbalized agreement.  Call the clinic if symptoms persist or worsen.     /72 in the office today.     HLD   - Cholesterol high and LDL borderline high per 9/2024 lipid panel. Patient advised to continue to lower levels through healthy diet and regular physical activity.  Advised patient to limit frequency of eating hard boiled eggs to a few times a week to avoid increase in cholesterol.  She has been doing excellent with exercise.  ASCVD 2.7% per 3/2025.      Anxiety  - Symptoms ongoing. Refilled alprazolam 0.5mg up to twice daily prn on hand if needed.  Maintained with buspirone to 10mg every day (she is only taking once daily and this is working well).  [place]ARRS reviewed. Substance controlled contract signed. Drug screen UTD.  Will repeat Drug Screen next visit.    Insomnia   - Continue Melatonin Nature's Bounty to help with both falling asleep and staying asleep.     Migraine   - Takes sumatriptan 6mg/0.5mL auto-injector, 0.5mL as needed at onset of migraine.     Women's Wellness  - Following with  from Obstetrics/Gynecology.     Back Pain  - Previously prescribed ibuprofen 600mg up to three times daily as needed.  Advised patient to try and alternate medication with Tylenol OTC as directed on packaging.  Also recommended patient try heating pads, and Salonpas patches for additional relief.  Call the clinic if symptoms persist or worsen.    Vitamin D Deficiency   - Takes Vitamin D 50mcg QD.  Last Vitamin D wnl 64 per 5/2024.     Health Maintenance   - Routine labs 9/2024, reviewed in detail. Last Pap 3/2024.  Last Mammogram 4/2024. Last colonoscopy 10/2022, repeat due 10/2027. Can do EKG next visit.     Follow up according to routine health maintenance, call sooner if needed.       \"I, Dr. Israel, " personally performed the services described in the documentation as scribed by Heidi Guy in my presence, and confirm it is both accurate and complete.   Scribe Attestation     Scribe Attestation  By signing my name below, I, Heidi Guy, Scrjuane   attest that this documentation has been prepared under the direction and in the presence of Emile Israel DO.   Heidi Guy 07/18/25 1:01 PM

## 2025-07-28 ENCOUNTER — PATIENT MESSAGE (OUTPATIENT)
Dept: PRIMARY CARE | Facility: CLINIC | Age: 60
End: 2025-07-28
Payer: COMMERCIAL

## 2025-07-28 DIAGNOSIS — B37.9 YEAST INFECTION: Primary | ICD-10-CM

## 2025-07-28 RX ORDER — FLUCONAZOLE 150 MG/1
TABLET ORAL
Qty: 2 TABLET | Refills: 0 | Status: SHIPPED | OUTPATIENT
Start: 2025-07-28

## 2025-08-07 DIAGNOSIS — F41.9 ANXIETY: ICD-10-CM

## 2025-08-07 RX ORDER — ALPRAZOLAM 0.5 MG/1
0.5 TABLET ORAL 2 TIMES DAILY PRN
Qty: 60 TABLET | Refills: 0 | Status: SHIPPED | OUTPATIENT
Start: 2025-08-07

## 2025-09-12 ENCOUNTER — APPOINTMENT (OUTPATIENT)
Dept: PRIMARY CARE | Facility: CLINIC | Age: 60
End: 2025-09-12
Payer: COMMERCIAL